# Patient Record
Sex: FEMALE | Race: WHITE | NOT HISPANIC OR LATINO
[De-identification: names, ages, dates, MRNs, and addresses within clinical notes are randomized per-mention and may not be internally consistent; named-entity substitution may affect disease eponyms.]

---

## 2020-10-01 PROBLEM — Z00.00 ENCOUNTER FOR PREVENTIVE HEALTH EXAMINATION: Status: ACTIVE | Noted: 2020-10-01

## 2020-10-03 ENCOUNTER — APPOINTMENT (OUTPATIENT)
Dept: CT IMAGING | Facility: IMAGING CENTER | Age: 50
End: 2020-10-03

## 2020-10-21 ENCOUNTER — APPOINTMENT (OUTPATIENT)
Dept: CT IMAGING | Facility: IMAGING CENTER | Age: 50
End: 2020-10-21

## 2020-11-17 ENCOUNTER — OUTPATIENT (OUTPATIENT)
Dept: OUTPATIENT SERVICES | Facility: HOSPITAL | Age: 50
LOS: 1 days | End: 2020-11-17
Payer: COMMERCIAL

## 2020-11-17 ENCOUNTER — APPOINTMENT (OUTPATIENT)
Dept: CT IMAGING | Facility: IMAGING CENTER | Age: 50
End: 2020-11-17
Payer: COMMERCIAL

## 2020-11-17 DIAGNOSIS — Z00.8 ENCOUNTER FOR OTHER GENERAL EXAMINATION: ICD-10-CM

## 2020-11-17 PROCEDURE — 74177 CT ABD & PELVIS W/CONTRAST: CPT

## 2020-11-17 PROCEDURE — 74177 CT ABD & PELVIS W/CONTRAST: CPT | Mod: 26

## 2021-10-12 ENCOUNTER — FORM ENCOUNTER (OUTPATIENT)
Age: 51
End: 2021-10-12

## 2022-11-09 ENCOUNTER — NON-APPOINTMENT (OUTPATIENT)
Age: 52
End: 2022-11-09

## 2022-11-09 RX ORDER — CLOTRIMAZOLE AND BETAMETHASONE DIPROPIONATE 10; .5 MG/G; MG/G
1-0.05 CREAM TOPICAL
Qty: 1 | Refills: 0 | Status: ACTIVE | COMMUNITY
Start: 2022-11-09 | End: 1900-01-01

## 2022-12-13 ENCOUNTER — NON-APPOINTMENT (OUTPATIENT)
Age: 52
End: 2022-12-13

## 2022-12-20 ENCOUNTER — NON-APPOINTMENT (OUTPATIENT)
Age: 52
End: 2022-12-20

## 2022-12-22 ENCOUNTER — APPOINTMENT (OUTPATIENT)
Dept: SURGERY | Facility: CLINIC | Age: 52
End: 2022-12-22

## 2022-12-22 VITALS
DIASTOLIC BLOOD PRESSURE: 92 MMHG | WEIGHT: 200 LBS | BODY MASS INDEX: 30.31 KG/M2 | SYSTOLIC BLOOD PRESSURE: 150 MMHG | HEART RATE: 79 BPM | HEIGHT: 68 IN | OXYGEN SATURATION: 95 %

## 2022-12-22 DIAGNOSIS — Z78.9 OTHER SPECIFIED HEALTH STATUS: ICD-10-CM

## 2022-12-22 DIAGNOSIS — Z80.0 FAMILY HISTORY OF MALIGNANT NEOPLASM OF DIGESTIVE ORGANS: ICD-10-CM

## 2022-12-22 DIAGNOSIS — Z82.49 FAMILY HISTORY OF ISCHEMIC HEART DISEASE AND OTHER DISEASES OF THE CIRCULATORY SYSTEM: ICD-10-CM

## 2022-12-22 PROCEDURE — 99202 OFFICE O/P NEW SF 15 MIN: CPT

## 2022-12-22 NOTE — ASSESSMENT
[FreeTextEntry1] : I have discussed with pt that she needs a breast MRI for staging purposes . pt to return to office after.\par Pt has also requested a genetic testing and it sent.

## 2022-12-22 NOTE — HISTORY OF PRESENT ILLNESS
[FreeTextEntry1] : 52 year old white female who felt a right breast lump and went for a core biopsy that showed invasive cancer., She denies any other breast lesions and does not have any breast pain or nipple discharge.\par Risk factors.\par   FHX negative\par   1st period 14\par    1st child 32\par    without prior biopsy

## 2022-12-22 NOTE — REVIEW OF SYSTEMS
[Skin Lesions] : no skin lesions [Skin Wound] : no skin wound [Itching] : no itching [Change In A Mole] : no change in a mole [Breast Pain] : no breast pain [Breast Lump] : breast lump [Negative] : Heme/Lymph

## 2022-12-22 NOTE — PAST MEDICAL HISTORY
[Menarche Age ____] : age at menarche was [unfilled] [Definite ___ (Date)] : the last menstrual period was [unfilled] [Total Preg ___] : G[unfilled] [Live Births ___] : P[unfilled]  [Full Term ___] : Full Term: [unfilled] [Age At Live Birth ___] : Age at live birth: [unfilled] [FreeTextEntry5] : N/A [FreeTextEntry6] : N/A [FreeTextEntry7] : N/A [FreeTextEntry8] : N/A

## 2022-12-23 ENCOUNTER — APPOINTMENT (OUTPATIENT)
Dept: OBGYN | Facility: CLINIC | Age: 52
End: 2022-12-23

## 2022-12-30 ENCOUNTER — RESULT REVIEW (OUTPATIENT)
Age: 52
End: 2022-12-30

## 2022-12-30 ENCOUNTER — OUTPATIENT (OUTPATIENT)
Dept: OUTPATIENT SERVICES | Facility: HOSPITAL | Age: 52
LOS: 1 days | End: 2022-12-30
Payer: COMMERCIAL

## 2022-12-30 VITALS
HEART RATE: 72 BPM | RESPIRATION RATE: 16 BRPM | DIASTOLIC BLOOD PRESSURE: 84 MMHG | TEMPERATURE: 98 F | OXYGEN SATURATION: 98 % | HEIGHT: 68 IN | WEIGHT: 227.08 LBS | SYSTOLIC BLOOD PRESSURE: 134 MMHG

## 2022-12-30 DIAGNOSIS — Z90.49 ACQUIRED ABSENCE OF OTHER SPECIFIED PARTS OF DIGESTIVE TRACT: Chronic | ICD-10-CM

## 2022-12-30 DIAGNOSIS — N63.10 UNSPECIFIED LUMP IN THE RIGHT BREAST, UNSPECIFIED QUADRANT: ICD-10-CM

## 2022-12-30 DIAGNOSIS — Z01.818 ENCOUNTER FOR OTHER PREPROCEDURAL EXAMINATION: ICD-10-CM

## 2022-12-30 DIAGNOSIS — Z98.891 HISTORY OF UTERINE SCAR FROM PREVIOUS SURGERY: Chronic | ICD-10-CM

## 2022-12-30 DIAGNOSIS — C50.511 MALIGNANT NEOPLASM OF LOWER-OUTER QUADRANT OF RIGHT FEMALE BREAST: ICD-10-CM

## 2022-12-30 LAB
ANION GAP SERPL CALC-SCNC: 7 MMOL/L — SIGNIFICANT CHANGE UP (ref 5–17)
BUN SERPL-MCNC: 14 MG/DL — SIGNIFICANT CHANGE UP (ref 7–23)
CALCIUM SERPL-MCNC: 8.7 MG/DL — SIGNIFICANT CHANGE UP (ref 8.5–10.1)
CHLORIDE SERPL-SCNC: 109 MMOL/L — HIGH (ref 96–108)
CO2 SERPL-SCNC: 26 MMOL/L — SIGNIFICANT CHANGE UP (ref 22–31)
CREAT SERPL-MCNC: 0.63 MG/DL — SIGNIFICANT CHANGE UP (ref 0.5–1.3)
EGFR: 107 ML/MIN/1.73M2 — SIGNIFICANT CHANGE UP
GLUCOSE SERPL-MCNC: 93 MG/DL — SIGNIFICANT CHANGE UP (ref 70–99)
HCG UR QL: NEGATIVE — SIGNIFICANT CHANGE UP
HCT VFR BLD CALC: 32.7 % — LOW (ref 34.5–45)
HGB BLD-MCNC: 10.5 G/DL — LOW (ref 11.5–15.5)
MCHC RBC-ENTMCNC: 27.3 PG — SIGNIFICANT CHANGE UP (ref 27–34)
MCHC RBC-ENTMCNC: 32.1 GM/DL — SIGNIFICANT CHANGE UP (ref 32–36)
MCV RBC AUTO: 85.2 FL — SIGNIFICANT CHANGE UP (ref 80–100)
NRBC # BLD: 0 /100 WBCS — SIGNIFICANT CHANGE UP (ref 0–0)
PLATELET # BLD AUTO: 306 K/UL — SIGNIFICANT CHANGE UP (ref 150–400)
POTASSIUM SERPL-MCNC: 4 MMOL/L — SIGNIFICANT CHANGE UP (ref 3.5–5.3)
POTASSIUM SERPL-SCNC: 4 MMOL/L — SIGNIFICANT CHANGE UP (ref 3.5–5.3)
RBC # BLD: 3.84 M/UL — SIGNIFICANT CHANGE UP (ref 3.8–5.2)
RBC # FLD: 15.2 % — HIGH (ref 10.3–14.5)
SODIUM SERPL-SCNC: 142 MMOL/L — SIGNIFICANT CHANGE UP (ref 135–145)
WBC # BLD: 6.43 K/UL — SIGNIFICANT CHANGE UP (ref 3.8–10.5)
WBC # FLD AUTO: 6.43 K/UL — SIGNIFICANT CHANGE UP (ref 3.8–10.5)

## 2022-12-30 PROCEDURE — 80048 BASIC METABOLIC PNL TOTAL CA: CPT

## 2022-12-30 PROCEDURE — 36415 COLL VENOUS BLD VENIPUNCTURE: CPT

## 2022-12-30 PROCEDURE — 88321 CONSLTJ&REPRT SLD PREP ELSWR: CPT

## 2022-12-30 PROCEDURE — 85027 COMPLETE CBC AUTOMATED: CPT

## 2022-12-30 PROCEDURE — G0463: CPT

## 2022-12-30 PROCEDURE — 93010 ELECTROCARDIOGRAM REPORT: CPT

## 2022-12-30 PROCEDURE — 93005 ELECTROCARDIOGRAM TRACING: CPT

## 2022-12-30 PROCEDURE — 81025 URINE PREGNANCY TEST: CPT

## 2022-12-30 NOTE — H&P PST ADULT - SKIN COMMENTS
right breast . Palpable lump midline 6 o' clock, had biopsy. sterri strips in place. ecchymotic area at the biopsy site

## 2022-12-30 NOTE — H&P PST ADULT - NSICDXFAMILYHX_GEN_ALL_CORE_FT
FAMILY HISTORY:  Father  Still living? No  FH: cirrhosis, Age at diagnosis: Age Unknown    Mother  Still living? Yes, Estimated age: 71-80  FH: stroke, Age at diagnosis: Age Unknown

## 2022-12-30 NOTE — H&P PST ADULT - HISTORY OF PRESENT ILLNESS
53 y/o healthy female with c/o feeling a lump in the right breast in late October 2022, had a mammo, sonogram, MRI and a biopsy done in the past few weeks. Diagnosed with ductal carcinoma. Seen by Dr Robison, scheduled for right breast needle LOC excision of mass w/sentinel node biopsy on 1/9/23. Pre op testing today.

## 2022-12-30 NOTE — H&P PST ADULT - PROBLEM SELECTOR PLAN 1
Scheduled for right breast needle LOC excision of mass w/sentinel node biopsy on 1/9/23. Pre op testing today.  Not Vaccinated for Covid. Had covid Dec 2021 was sick for many days with cough and breathing difficulty.   LABS AND EKG DONE TODAY  Medical eval advised.  PRE OP INSTRUCTIONS:  Hold OTC supplements. Medications reviewed for the week and morning of surgery.  NPO after 11pm to the morning of surgery.  Shower 2 days before and the morning of surgery with antibacterial soap as instructed.  Covid testing 3 days prior to procedure, information given.  Patient verbalized understanding.

## 2022-12-30 NOTE — H&P PST ADULT - NSANTHOSAYNRD_GEN_A_CORE
No. REMI screening performed.  STOP BANG Legend: 0-2 = LOW Risk; 3-4 = INTERMEDIATE Risk; 5-8 = HIGH Risk

## 2023-01-03 ENCOUNTER — APPOINTMENT (OUTPATIENT)
Dept: SURGERY | Facility: CLINIC | Age: 53
End: 2023-01-03
Payer: COMMERCIAL

## 2023-01-03 PROCEDURE — 99212 OFFICE O/P EST SF 10 MIN: CPT

## 2023-01-03 NOTE — HISTORY OF PRESENT ILLNESS
[FreeTextEntry1] : 52 year old white female who felt a right breast lump and went for a core biopsy that showed invasive cancer., She denies any other breast lesions and does not have any breast pain or nipple discharge.\par Risk factors.\par   FHX negative\par   1st period 14\par    1st child 32\par    without prior biopsy\par 1/3/22 ADDENDUM\par Pt had an MRI of her breast and is here to go over results and to discuss receptor status.

## 2023-01-03 NOTE — ASSESSMENT
[FreeTextEntry1] : I have discussed with pt that\par           ER and DC receptors are positive 90%\par           HER-2-nu is negative\par      a new lesion on the left breast was identified and needs an MRI core biopsty\par      Genetic testing is still pending

## 2023-01-03 NOTE — PHYSICAL EXAM
[Normocephalic] : normocephalic [Atraumatic] : atraumatic [EOMI] : extra ocular movement intact [PERRL] : pupils equal, round and reactive to light [Sclera nonicteric] : sclera nonicteric [Supple] : supple [No Supraclavicular Adenopathy] : no supraclavicular adenopathy [Clear to Auscultation Bilat] : clear to auscultation bilaterally [Normal Sinus Rhythm] : normal sinus rhythm [Examined in the supine and seated position] : examined in the supine and seated position [Symmetrical] : symmetrical [No dominant masses] : no dominant masses in right breast  [No dominant masses] : no dominant masses left breast [No Nipple Retraction] : no left nipple retraction [No Nipple Discharge] : no left nipple discharge [No Axillary Lymphadenopathy] : no left axillary lymphadenopathy [de-identified] : well developed female in no acute distress

## 2023-01-09 DIAGNOSIS — Z13.820 ENCOUNTER FOR SCREENING FOR OSTEOPOROSIS: ICD-10-CM

## 2023-01-10 ENCOUNTER — APPOINTMENT (OUTPATIENT)
Dept: OBGYN | Facility: CLINIC | Age: 53
End: 2023-01-10
Payer: COMMERCIAL

## 2023-01-10 VITALS
DIASTOLIC BLOOD PRESSURE: 86 MMHG | RESPIRATION RATE: 16 BRPM | SYSTOLIC BLOOD PRESSURE: 132 MMHG | WEIGHT: 220 LBS | HEART RATE: 73 BPM | BODY MASS INDEX: 33.34 KG/M2 | OXYGEN SATURATION: 95 % | HEIGHT: 68 IN

## 2023-01-10 DIAGNOSIS — B36.9 SUPERFICIAL MYCOSIS, UNSPECIFIED: ICD-10-CM

## 2023-01-10 DIAGNOSIS — Z12.11 ENCOUNTER FOR SCREENING FOR MALIGNANT NEOPLASM OF COLON: ICD-10-CM

## 2023-01-10 DIAGNOSIS — Z01.419 ENCOUNTER FOR GYNECOLOGICAL EXAMINATION (GENERAL) (ROUTINE) W/OUT ABNORMAL FINDINGS: ICD-10-CM

## 2023-01-10 DIAGNOSIS — D25.9 LEIOMYOMA OF UTERUS, UNSPECIFIED: ICD-10-CM

## 2023-01-10 DIAGNOSIS — Z12.4 ENCOUNTER FOR SCREENING FOR MALIGNANT NEOPLASM OF CERVIX: ICD-10-CM

## 2023-01-10 DIAGNOSIS — K64.9 UNSPECIFIED HEMORRHOIDS: ICD-10-CM

## 2023-01-10 DIAGNOSIS — Z12.39 ENCOUNTER FOR OTHER SCREENING FOR MALIGNANT NEOPLASM OF BREAST: ICD-10-CM

## 2023-01-10 DIAGNOSIS — Z13.31 ENCOUNTER FOR SCREENING FOR DEPRESSION: ICD-10-CM

## 2023-01-10 DIAGNOSIS — R92.8 OTHER ABNORMAL AND INCONCLUSIVE FINDINGS ON DIAGNOSTIC IMAGING OF BREAST: ICD-10-CM

## 2023-01-10 DIAGNOSIS — Z85.3 PERSONAL HISTORY OF MALIGNANT NEOPLASM OF BREAST: ICD-10-CM

## 2023-01-10 LAB
BILIRUB UR QL STRIP: NORMAL
CLARITY UR: CLEAR
COLLECTION METHOD: NORMAL
GLUCOSE UR-MCNC: NORMAL
HCG UR QL: 0.2 EU/DL
HGB UR QL STRIP.AUTO: NORMAL
KETONES UR-MCNC: NORMAL
LEUKOCYTE ESTERASE UR QL STRIP: NORMAL
NITRITE UR QL STRIP: NORMAL
PH UR STRIP: 6
PROT UR STRIP-MCNC: NORMAL
SP GR UR STRIP: 1.03

## 2023-01-10 PROCEDURE — 81003 URINALYSIS AUTO W/O SCOPE: CPT | Mod: QW

## 2023-01-10 PROCEDURE — 82270 OCCULT BLOOD FECES: CPT

## 2023-01-10 PROCEDURE — 99386 PREV VISIT NEW AGE 40-64: CPT

## 2023-01-10 RX ORDER — CLOTRIMAZOLE AND BETAMETHASONE DIPROPIONATE 10; .5 MG/G; MG/G
1-0.05 CREAM TOPICAL TWICE DAILY
Qty: 1 | Refills: 1 | Status: ACTIVE | COMMUNITY
Start: 2023-01-10 | End: 1900-01-01

## 2023-01-10 RX ORDER — HYDROCORTISONE 25 MG/G
2.5 CREAM TOPICAL DAILY
Qty: 1 | Refills: 1 | Status: ACTIVE | COMMUNITY
Start: 2023-01-10 | End: 1900-01-01

## 2023-01-10 NOTE — PLAN
[FreeTextEntry1] : I have spent 40 minutes of time on this encounter.  Greater than 50% of the face-to-face encounter time was spent on counseling and/or coordination of care for examination findings, differential, testing, management and planning. 10 minutes were allotted to discussing the depression screening. Yearly breast cancer screening with no current clinical or radiographic concerns.  The patient was reminded regarding future well breast and general healthcare, breast cancer risk reduction, the importance of self-examination and the need for follow up.   She was again reminded of the need to take Vit D3 2500  IU daily or to keep a Vit D level above 30, and Tumeric 1000mg daily with Black Pepper.  Plan continued yearly imaging and breast follow up, sooner as needed.  I counseled the patient on current recommendations to reduce breast cancer risk including but not inclusive to regular exercise 20-30 minutes 3-4 times a week, low fat diet, limiting alcohol consumption, maintenance of ideal body weight, yearly imaging and self breast awareness.  Questions answered.  I encouraged in light of Covid 19, social distancing, frequent hand washing and precautions to stay healthy.\par  1)  Self breast exam instructions, calcium supplementation discussed with the patient.\par 2)  Mammography, lipid profile assessment, TSH screening, fasting glucose testing, colonoscopy screening were discussed with the patient.  Vitamin D supplementation\par 3)  Maintain healthy weight.\par 4)  Regular health maintenance with PCP.\par 5)  Remain tobacco free.\par 6)  Limit alcohol intake to less than 5 drinks per week.\par 7)  Osteoporosis screening.\par 8)  Annual cholesterol screening.\par 9)  Annual influenza vaccine.The importance of routine physical activity was reviewed and a goal of 150 minutes of moderately vigorous exercise per week was endorsed. \par \par PT had questions regarding recent breast cancer diagnosis. Lumpectomy, unilateral mastectomy, and bilateral mastectomy was discussed at length. The risks, benefits and alternatives were discussed and all questions were answered.

## 2023-01-10 NOTE — HISTORY OF PRESENT ILLNESS
[N] : Patient reports normal menses [Y] : Positive pregnancy history [Hot Flashes] : hot flashes [Night Sweats] : night sweats [Currently Active] : currently active [Men] : men [Mammogramdate] : 12/22 [BreastSonogramDate] : 12/22 [PapSmeardate] : 01/22 [LMPDate] : 01/06/23 [MensesLength] : 4 [PGHxTotal] : 2 [TextBox_6] : 01/06/23 [TextBox_15] : 4 [FreeTextEntry1] : 01/06/23

## 2023-01-10 NOTE — PHYSICAL EXAM
[Chaperone Present] : A chaperone was present in the examining room during all aspects of the physical examination [Appropriately responsive] : appropriately responsive [Alert] : alert [No Acute Distress] : no acute distress [No Lymphadenopathy] : no lymphadenopathy [Regular Rate Rhythm] : regular rate rhythm [No Murmurs] : no murmurs [Clear to Auscultation B/L] : clear to auscultation bilaterally [Soft] : soft [Non-tender] : non-tender [Non-distended] : non-distended [No HSM] : No HSM [No Lesions] : no lesions [No Mass] : no mass [Oriented x3] : oriented x3 [Examination Of The Breasts] : a normal appearance [Labia Majora] : normal [Labia Minora] : normal [Normal] : normal [Uterine Adnexae] : normal [External Hemorrhoid] : external hemorrhoid [___cm] : a ~M [unfilled] ~Ucm mass was palpated deep to the nipple [FreeTextEntry1] : The documentation for this encounter was entered by Angeles Kumar acting as a scribe for Dr. Maravilla.  [Occult Blood Positive] : was negative for occult blood analysis [FreeTextEntry6] : small fibroid

## 2023-01-12 LAB
HPV 16 E6+E7 MRNA CVX QL NAA+PROBE: NOT DETECTED
HPV18+45 E6+E7 MRNA CVX QL NAA+PROBE: NOT DETECTED

## 2023-01-16 ENCOUNTER — NON-APPOINTMENT (OUTPATIENT)
Age: 53
End: 2023-01-16

## 2023-01-16 LAB — CYTOLOGY CVX/VAG DOC THIN PREP: ABNORMAL

## 2023-01-18 RX ORDER — BETAMETHASONE DIPROPIONATE 0.5 MG/G
0.05 CREAM TOPICAL TWICE DAILY
Qty: 1 | Refills: 1 | Status: ACTIVE | COMMUNITY
Start: 2023-01-18

## 2023-01-18 RX ORDER — CLOTRIMAZOLE 10 MG/G
1 CREAM TOPICAL 3 TIMES DAILY
Qty: 1 | Refills: 1 | Status: ACTIVE | COMMUNITY
Start: 2023-01-18

## 2023-02-06 ENCOUNTER — OUTPATIENT (OUTPATIENT)
Dept: OUTPATIENT SERVICES | Facility: HOSPITAL | Age: 53
LOS: 1 days | End: 2023-02-06
Payer: COMMERCIAL

## 2023-02-06 DIAGNOSIS — Z98.891 HISTORY OF UTERINE SCAR FROM PREVIOUS SURGERY: Chronic | ICD-10-CM

## 2023-02-06 DIAGNOSIS — Z20.828 CONTACT WITH AND (SUSPECTED) EXPOSURE TO OTHER VIRAL COMMUNICABLE DISEASES: ICD-10-CM

## 2023-02-06 DIAGNOSIS — Z90.49 ACQUIRED ABSENCE OF OTHER SPECIFIED PARTS OF DIGESTIVE TRACT: Chronic | ICD-10-CM

## 2023-02-06 LAB — SARS-COV-2 RNA SPEC QL NAA+PROBE: SIGNIFICANT CHANGE UP

## 2023-02-06 PROCEDURE — U0005: CPT

## 2023-02-06 PROCEDURE — U0003: CPT

## 2023-02-07 ENCOUNTER — TRANSCRIPTION ENCOUNTER (OUTPATIENT)
Age: 53
End: 2023-02-07

## 2023-02-07 LAB — SURGICAL PATHOLOGY STUDY: SIGNIFICANT CHANGE UP

## 2023-02-07 RX ORDER — SODIUM CHLORIDE 9 MG/ML
1000 INJECTION, SOLUTION INTRAVENOUS
Refills: 0 | Status: DISCONTINUED | OUTPATIENT
Start: 2023-02-08 | End: 2023-02-22

## 2023-02-07 NOTE — ASU PATIENT PROFILE, ADULT - FALL HARM RISK - FALL HARM RISK
Well controlled with medications. Continue present therapy but avoid hypotension given syncope.    No indicators present

## 2023-02-08 ENCOUNTER — OUTPATIENT (OUTPATIENT)
Dept: OUTPATIENT SERVICES | Facility: HOSPITAL | Age: 53
LOS: 1 days | End: 2023-02-08
Payer: COMMERCIAL

## 2023-02-08 ENCOUNTER — TRANSCRIPTION ENCOUNTER (OUTPATIENT)
Age: 53
End: 2023-02-08

## 2023-02-08 ENCOUNTER — RESULT REVIEW (OUTPATIENT)
Age: 53
End: 2023-02-08

## 2023-02-08 ENCOUNTER — APPOINTMENT (OUTPATIENT)
Dept: SURGERY | Facility: HOSPITAL | Age: 53
End: 2023-02-08

## 2023-02-08 VITALS
OXYGEN SATURATION: 98 % | HEIGHT: 68 IN | SYSTOLIC BLOOD PRESSURE: 130 MMHG | TEMPERATURE: 99 F | DIASTOLIC BLOOD PRESSURE: 81 MMHG | WEIGHT: 227.08 LBS | RESPIRATION RATE: 16 BRPM | HEART RATE: 66 BPM

## 2023-02-08 VITALS
HEART RATE: 55 BPM | DIASTOLIC BLOOD PRESSURE: 77 MMHG | RESPIRATION RATE: 16 BRPM | SYSTOLIC BLOOD PRESSURE: 142 MMHG | OXYGEN SATURATION: 97 %

## 2023-02-08 DIAGNOSIS — Z98.891 HISTORY OF UTERINE SCAR FROM PREVIOUS SURGERY: Chronic | ICD-10-CM

## 2023-02-08 DIAGNOSIS — Z90.49 ACQUIRED ABSENCE OF OTHER SPECIFIED PARTS OF DIGESTIVE TRACT: Chronic | ICD-10-CM

## 2023-02-08 DIAGNOSIS — C50.511 MALIGNANT NEOPLASM OF LOWER-OUTER QUADRANT OF RIGHT FEMALE BREAST: ICD-10-CM

## 2023-02-08 DIAGNOSIS — C50.919 MALIGNANT NEOPLASM OF UNSPECIFIED SITE OF UNSPECIFIED FEMALE BREAST: ICD-10-CM

## 2023-02-08 LAB — HCG UR QL: NEGATIVE — SIGNIFICANT CHANGE UP

## 2023-02-08 PROCEDURE — 19281 PERQ DEVICE BREAST 1ST IMAG: CPT | Mod: RT

## 2023-02-08 PROCEDURE — A9541: CPT

## 2023-02-08 PROCEDURE — 19301 PARTIAL MASTECTOMY: CPT | Mod: RT

## 2023-02-08 PROCEDURE — 88333 PATH CONSLTJ SURG CYTO XM 1: CPT

## 2023-02-08 PROCEDURE — 38525 BIOPSY/REMOVAL LYMPH NODES: CPT

## 2023-02-08 PROCEDURE — 81025 URINE PREGNANCY TEST: CPT

## 2023-02-08 PROCEDURE — 78195 LYMPH SYSTEM IMAGING: CPT | Mod: MG

## 2023-02-08 PROCEDURE — 88334 PATH CONSLTJ SURG CYTO XM EA: CPT | Mod: 26,59

## 2023-02-08 PROCEDURE — 88307 TISSUE EXAM BY PATHOLOGIST: CPT

## 2023-02-08 PROCEDURE — 88307 TISSUE EXAM BY PATHOLOGIST: CPT | Mod: 26

## 2023-02-08 PROCEDURE — C1889: CPT

## 2023-02-08 PROCEDURE — 38900 IO MAP OF SENT LYMPH NODE: CPT | Mod: XS

## 2023-02-08 PROCEDURE — 38900 IO MAP OF SENT LYMPH NODE: CPT

## 2023-02-08 PROCEDURE — 76098 X-RAY EXAM SURGICAL SPECIMEN: CPT

## 2023-02-08 PROCEDURE — 78195 LYMPH SYSTEM IMAGING: CPT | Mod: 26

## 2023-02-08 PROCEDURE — 19302 P-MASTECTOMY W/LN REMOVAL: CPT | Mod: RT

## 2023-02-08 PROCEDURE — 76098 X-RAY EXAM SURGICAL SPECIMEN: CPT | Mod: 26

## 2023-02-08 PROCEDURE — 19281 PERQ DEVICE BREAST 1ST IMAG: CPT

## 2023-02-08 PROCEDURE — 88334 PATH CONSLTJ SURG CYTO XM EA: CPT

## 2023-02-08 PROCEDURE — 88331 PATH CONSLTJ SURG 1 BLK 1SPC: CPT

## 2023-02-08 PROCEDURE — 88331 PATH CONSLTJ SURG 1 BLK 1SPC: CPT | Mod: 26

## 2023-02-08 PROCEDURE — G1004: CPT

## 2023-02-08 DEVICE — CLIP APPLIER ETHICON LIGACLIP 11.5" MEDIUM: Type: IMPLANTABLE DEVICE | Site: RIGHT | Status: FUNCTIONAL

## 2023-02-08 RX ORDER — OXYCODONE HYDROCHLORIDE 5 MG/1
5 TABLET ORAL ONCE
Refills: 0 | Status: DISCONTINUED | OUTPATIENT
Start: 2023-02-08 | End: 2023-02-08

## 2023-02-08 RX ORDER — SODIUM CHLORIDE 9 MG/ML
1000 INJECTION, SOLUTION INTRAVENOUS
Refills: 0 | Status: DISCONTINUED | OUTPATIENT
Start: 2023-02-08 | End: 2023-02-08

## 2023-02-08 RX ORDER — CEFAZOLIN SODIUM 1 G
2000 VIAL (EA) INJECTION ONCE
Refills: 0 | Status: COMPLETED | OUTPATIENT
Start: 2023-02-08 | End: 2023-02-08

## 2023-02-08 RX ORDER — ONDANSETRON 8 MG/1
4 TABLET, FILM COATED ORAL ONCE
Refills: 0 | Status: DISCONTINUED | OUTPATIENT
Start: 2023-02-08 | End: 2023-02-08

## 2023-02-08 RX ORDER — DOCUSATE SODIUM 100 MG
1 CAPSULE ORAL
Qty: 30 | Refills: 0
Start: 2023-02-08

## 2023-02-08 RX ORDER — HYDROMORPHONE HYDROCHLORIDE 2 MG/ML
0.5 INJECTION INTRAMUSCULAR; INTRAVENOUS; SUBCUTANEOUS
Refills: 0 | Status: DISCONTINUED | OUTPATIENT
Start: 2023-02-08 | End: 2023-02-08

## 2023-02-08 RX ORDER — OXYCODONE AND ACETAMINOPHEN 5; 325 MG/1; MG/1
1 TABLET ORAL
Qty: 15 | Refills: 0
Start: 2023-02-08

## 2023-02-08 RX ADMIN — SODIUM CHLORIDE 75 MILLILITER(S): 9 INJECTION, SOLUTION INTRAVENOUS at 15:50

## 2023-02-08 RX ADMIN — SODIUM CHLORIDE 75 MILLILITER(S): 9 INJECTION, SOLUTION INTRAVENOUS at 09:07

## 2023-02-08 RX ADMIN — HYDROMORPHONE HYDROCHLORIDE 0.5 MILLIGRAM(S): 2 INJECTION INTRAMUSCULAR; INTRAVENOUS; SUBCUTANEOUS at 15:49

## 2023-02-08 NOTE — ASU DISCHARGE PLAN (ADULT/PEDIATRIC) - CARE PROVIDER_API CALL
Brendon Robison)  Surgery  [address] [place] [address] [id number]  Phone: [phone]  Fax: [phone]  Follow Up Time:

## 2023-02-08 NOTE — ASU DISCHARGE PLAN (ADULT/PEDIATRIC) - ASU DC SPECIAL INSTRUCTIONSFT
You may remove the dressing (tape and gauze only) and shower in 48 hours.  Do not remove steri-strips, do not let water hit wounds directly, do not rub incisions.      Do not drive for 24 hours following anesthesia.  Do not drive while taking narcotic pain medication.  Do not drive until pain-free.     No exercise, strenuous physical activity, or heavy lifting (nothing heavier than 5 lbs).      Wear a tight bra for compression of the incision, and apply ice liberally as tolerated (20min on/20min off the incision site for 2-3 hours a day, at least twice a day).      You may resume your usual daily diet as tolerated.     You may perform your usual daily activities as tolerated (e.g. walking, stairclimbing, etc.).      Take Percocet as prescribed for severe pain.  Take Ibuprofen as prescribed for moderate pain - take with food.       Follow-up with Dr. Robison in his office next week - call office for appointment if not already made. You may remove the dressing (tape and gauze only) and shower in 48 hours.  Do not remove steri-strips, do not let water hit wounds directly, do not rub incisions.      Do not drive for 24 hours following anesthesia.  Do not drive while taking narcotic pain medication.  Do not drive until pain-free.     No exercise, strenuous physical activity, or heavy lifting (nothing heavier than 5 lbs).      Wear a tight bra for compression of the incision, and apply ice liberally as tolerated (20min on/20min off the incision site for 2-3 hours a day, at least twice a day).      You may resume your usual daily diet as tolerated.     You may perform your usual daily activities as tolerated (e.g. walking, stair-climbing, etc.).      Take Percocet as prescribed for severe pain.  Take Ibuprofen as prescribed for moderate pain - take with food.       Follow-up with Dr. Robison in his office next week - call office for appointment if not already made.

## 2023-02-08 NOTE — PRE-OP CHECKLIST - NS ASU TO WHOM HOLDING TO OR
Left anterior lower neck with bandage CDI without bleeding or complication.  Discharge instruction given to pt, pt without questions or concerns.      jenna chandler

## 2023-02-08 NOTE — ASU DISCHARGE PLAN (ADULT/PEDIATRIC) - NS MD DC FALL RISK RISK
For information on Fall & Injury Prevention, visit: [email or web address] OR  [email or web address] OR  https://www.cdc.gov/steadi/patient.html

## 2023-02-08 NOTE — BRIEF OPERATIVE NOTE - NSICDXBRIEFPROCEDURE_GEN_ALL_CORE_FT
PROCEDURES:  Right breast lumpectomy 08-Feb-2023 15:16:37  Brendon Robison  Biopsy, breast and axillary node, with needle localization 08-Feb-2023 15:17:13  Brendon Robison  Localization of sentinel lymph node 08-Feb-2023 15:17:44  Brendon Robison

## 2023-02-14 ENCOUNTER — APPOINTMENT (OUTPATIENT)
Dept: SURGERY | Facility: CLINIC | Age: 53
End: 2023-02-14
Payer: COMMERCIAL

## 2023-02-14 PROCEDURE — 99024 POSTOP FOLLOW-UP VISIT: CPT

## 2023-02-14 NOTE — HISTORY OF PRESENT ILLNESS
[FreeTextEntry1] : 52 year old white female who felt a right breast lump and went for a core biopsy that showed invasive cancer., She denies any other breast lesions and does not have any breast pain or nipple discharge.\par Risk factors.\par   FHX negative\par   1st period 14\par    1st child 32\par    without prior biopsy\par 1/3/22 ADDENDUM\par Pt had an MRI of her breast and is here to go over results and to discuss receptor status.\par 2/8/23 s/p needle localized right lumpectomy and sentinel node biopsy [facility]\par 2/14/23 Pt is post op and offers no complaints, minimal pain and without drainage.

## 2023-02-14 NOTE — ASSESSMENT
[FreeTextEntry1] : Pt doing well, but pathology is still pending\par PLAN make appointment with Oncologist\par            RTO on Thursday to go over results.

## 2023-02-14 NOTE — PHYSICAL EXAM
[Normocephalic] : normocephalic [Atraumatic] : atraumatic [EOMI] : extra ocular movement intact [PERRL] : pupils equal, round and reactive to light [Sclera nonicteric] : sclera nonicteric [Supple] : supple [No Supraclavicular Adenopathy] : no supraclavicular adenopathy [No Cervical Adenopathy] : no cervical adenopathy [Clear to Auscultation Bilat] : clear to auscultation bilaterally [Normal Sinus Rhythm] : normal sinus rhythm [Examined in the supine and seated position] : examined in the supine and seated position [No Axillary Lymphadenopathy] : no left axillary lymphadenopathy [No Edema] : no edema [de-identified] : well developed white female in no distress [de-identified] : incision clean and closed, without erythema [de-identified] : incision clean and closed, without swelling

## 2023-02-15 LAB — SURGICAL PATHOLOGY STUDY: SIGNIFICANT CHANGE UP

## 2023-02-16 ENCOUNTER — APPOINTMENT (OUTPATIENT)
Dept: SURGERY | Facility: CLINIC | Age: 53
End: 2023-02-16
Payer: COMMERCIAL

## 2023-02-16 VITALS — TEMPERATURE: 97.8 F

## 2023-02-16 PROCEDURE — 99024 POSTOP FOLLOW-UP VISIT: CPT

## 2023-02-16 NOTE — ASSESSMENT
[FreeTextEntry1] : I have discussed with pt and  the pathology findings; ie margins are clear and one of six nodes are involved. I contacted pts PMD DR Luna who recommended she see the oncologist Dr Wheeler. All reports were faxed to Dr Wheeler.

## 2023-02-16 NOTE — HISTORY OF PRESENT ILLNESS
[FreeTextEntry1] : 52 year old white female who felt a right breast lump and went for a core biopsy that showed invasive cancer., She denies any other breast lesions and does not have any breast pain or nipple discharge.\par Risk factors.\par   FHX negative\par   1st period 14\par    1st child 32\par    without prior biopsy\par 1/3/22 ADDENDUM\par Pt had an MRI of her breast and is here to go over results and to discuss receptor status.\par 2/8/23 s/p needle localized right lumpectomy and sentinel node biopsy [facility]\par 2/14/23 Pt is post op and offers no complaints, minimal pain and without drainage.\par 2/16/23 Pt is here to discuss her pathology results.

## 2023-02-16 NOTE — PHYSICAL EXAM
[Normocephalic] : normocephalic [Atraumatic] : atraumatic [EOMI] : extra ocular movement intact [PERRL] : pupils equal, round and reactive to light [Sclera nonicteric] : sclera nonicteric [Supple] : supple [No Supraclavicular Adenopathy] : no supraclavicular adenopathy [No Cervical Adenopathy] : no cervical adenopathy [Clear to Auscultation Bilat] : clear to auscultation bilaterally [Normal Sinus Rhythm] : normal sinus rhythm [Examined in the supine and seated position] : examined in the supine and seated position [de-identified] : well developed female in no distress [de-identified] : incision clean and closed [de-identified] : incision clean and closed without swelling

## 2023-05-02 ENCOUNTER — APPOINTMENT (OUTPATIENT)
Dept: SURGERY | Facility: CLINIC | Age: 53
End: 2023-05-02
Payer: COMMERCIAL

## 2023-05-02 DIAGNOSIS — Z98.890 OTHER SPECIFIED POSTPROCEDURAL STATES: ICD-10-CM

## 2023-05-02 PROCEDURE — 99024 POSTOP FOLLOW-UP VISIT: CPT

## 2023-05-02 NOTE — ASSESSMENT
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 Patient and  are upset and I have offered to help in any way I can and If they would like to see another surgeon I will help them get the records that they need.

## 2023-05-02 NOTE — PHYSICAL EXAM
[Normocephalic] : normocephalic [Atraumatic] : atraumatic [EOMI] : extra ocular movement intact [PERRL] : pupils equal, round and reactive to light [Sclera nonicteric] : sclera nonicteric [Supple] : supple [No Supraclavicular Adenopathy] : no supraclavicular adenopathy [No Cervical Adenopathy] : no cervical adenopathy [Clear to Auscultation Bilat] : clear to auscultation bilaterally [Normal Sinus Rhythm] : normal sinus rhythm [Examined in the supine and seated position] : examined in the supine and seated position [No Axillary Lymphadenopathy] : no left axillary lymphadenopathy [de-identified] : white female in oo acute distress [de-identified] : incision is clean and closed.

## 2023-05-02 NOTE — HISTORY OF PRESENT ILLNESS
[FreeTextEntry1] : 52 year old white female who felt a right breast lump and went for a core biopsy that showed invasive cancer., She denies any other breast lesions and does not have any breast pain or nipple discharge.\par Risk factors.\par   FHX negative\par   1st period 14\par    1st child 32\par    without prior biopsy\par 1/3/22 ADDENDUM\par Pt had an MRI of her breast and is here to go over results and to discuss receptor status.\par 2/8/23 s/p needle localized right lumpectomy and sentinel node biopsy [facility]\par 2/14/23 Pt is post op and offers no complaints, minimal pain and without drainage.\par 2/16/23 Pt is here to discuss her pathology results.\par 5/2/23 pt was seen by Radiation therapist who recommends she has a wider incision since 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

## 2023-05-10 ENCOUNTER — OUTPATIENT (OUTPATIENT)
Dept: OUTPATIENT SERVICES | Facility: HOSPITAL | Age: 53
LOS: 1 days | End: 2023-05-10
Payer: COMMERCIAL

## 2023-05-10 VITALS
SYSTOLIC BLOOD PRESSURE: 139 MMHG | OXYGEN SATURATION: 98 % | HEART RATE: 75 BPM | WEIGHT: 229.94 LBS | RESPIRATION RATE: 16 BRPM | DIASTOLIC BLOOD PRESSURE: 94 MMHG | TEMPERATURE: 97 F

## 2023-05-10 DIAGNOSIS — Z98.890 OTHER SPECIFIED POSTPROCEDURAL STATES: Chronic | ICD-10-CM

## 2023-05-10 DIAGNOSIS — Z01.818 ENCOUNTER FOR OTHER PREPROCEDURAL EXAMINATION: ICD-10-CM

## 2023-05-10 DIAGNOSIS — Z90.49 ACQUIRED ABSENCE OF OTHER SPECIFIED PARTS OF DIGESTIVE TRACT: Chronic | ICD-10-CM

## 2023-05-10 DIAGNOSIS — C50.511 MALIGNANT NEOPLASM OF LOWER-OUTER QUADRANT OF RIGHT FEMALE BREAST: ICD-10-CM

## 2023-05-10 DIAGNOSIS — Z98.891 HISTORY OF UTERINE SCAR FROM PREVIOUS SURGERY: Chronic | ICD-10-CM

## 2023-05-10 LAB
HCG UR QL: NEGATIVE — SIGNIFICANT CHANGE UP
HCT VFR BLD CALC: 34.8 % — SIGNIFICANT CHANGE UP (ref 34.5–45)
HGB BLD-MCNC: 10.8 G/DL — LOW (ref 11.5–15.5)
MCHC RBC-ENTMCNC: 26 PG — LOW (ref 27–34)
MCHC RBC-ENTMCNC: 31 GM/DL — LOW (ref 32–36)
MCV RBC AUTO: 83.9 FL — SIGNIFICANT CHANGE UP (ref 80–100)
NRBC # BLD: 0 /100 WBCS — SIGNIFICANT CHANGE UP (ref 0–0)
PLATELET # BLD AUTO: 306 K/UL — SIGNIFICANT CHANGE UP (ref 150–400)
RBC # BLD: 4.15 M/UL — SIGNIFICANT CHANGE UP (ref 3.8–5.2)
RBC # FLD: 15.3 % — HIGH (ref 10.3–14.5)
WBC # BLD: 5.16 K/UL — SIGNIFICANT CHANGE UP (ref 3.8–10.5)
WBC # FLD AUTO: 5.16 K/UL — SIGNIFICANT CHANGE UP (ref 3.8–10.5)

## 2023-05-10 PROCEDURE — G0463: CPT

## 2023-05-10 PROCEDURE — 81025 URINE PREGNANCY TEST: CPT

## 2023-05-10 PROCEDURE — 85027 COMPLETE CBC AUTOMATED: CPT

## 2023-05-10 PROCEDURE — 36415 COLL VENOUS BLD VENIPUNCTURE: CPT

## 2023-05-10 RX ORDER — IBUPROFEN 200 MG
1 TABLET ORAL
Qty: 0 | Refills: 0 | DISCHARGE

## 2023-05-10 NOTE — H&P PST ADULT - PROBLEM SELECTOR PLAN 1
Scheduled for right breast needle LOC excision of mass w/sentinel node biopsy on 1/9/23. Pre op testing today.  Not Vaccinated for Covid. Had covid Dec 2021 was sick for many days with cough and breathing difficulty.   LABS AND EKG DONE TODAY  Medical eval advised.  PRE OP INSTRUCTIONS:  Hold OTC supplements. Medications reviewed for the week and morning of surgery.  NPO after 11pm to the morning of surgery.  Shower 2 days before and the morning of surgery with antibacterial soap as instructed.  Covid testing 3 days prior to procedure, information given.  Patient verbalized understanding. Wide excision right breast mass on 5/26/2023.

## 2023-05-10 NOTE — H&P PST ADULT - NSICDXPROCEDURE_GEN_ALL_CORE_FT
PROCEDURES:  Wide excision of mass of right breast with wire guidance 10-May-2023 08:42:05  Leana Waldron

## 2023-05-10 NOTE — H&P PST ADULT - HISTORY OF PRESENT ILLNESS
53 y/o healthy female with c/o feeling a lump in the right breast in late October 2022, had a mammo, sonogram, MRI and a biopsy done in the past few weeks. Diagnosed with ductal carcinoma. Seen by Dr Robison, scheduled for right breast needle LOC excision of mass w/sentinel node biopsy on 1/9/23. Pre op testing today.      53 y/o healthy female with c/o feeling a lump in the right breast in late October 2022, had a mammo, sonogram, MRI and a biopsy done in the past few weeks. Diagnosed with ductal carcinoma. Seen by Dr Robison, scheduled for right breast needle LOC excision of mass w/sentinel node biopsy on 1/9/23. Pre op testing today.    ADDENDUM: Pt's history reviewed as above. S/p right breast lumpectomy on 1/9/2023 and diagnosed with DCIS. 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 Pt denies right breast mass, rash and pain.   Pt electing for wide excision right breast mass on 5/26/2023.

## 2023-05-10 NOTE — H&P PST ADULT - NEUROLOGICAL
normal/cranial nerves II-XII intact/sensation intact negative normal/sensation intact/responds to verbal commands

## 2023-05-10 NOTE — H&P PST ADULT - PROBLEM SELECTOR PLAN 2
No medical clearance needed as per surgeon. CBC and UCG ordered. Pre-op instructions and surgical scrubs given and pt verbalized understanding.

## 2023-05-10 NOTE — H&P PST ADULT - NEGATIVE GENERAL SYMPTOMS
no fever/no chills/no sweating/no anorexia/no weight loss no fever/no chills/no sweating/no anorexia/no weight loss/no malaise/no fatigue

## 2023-05-10 NOTE — H&P PST ADULT - LYMPHATIC
No lymphadedenopathy posterior cervical L/posterior cervical R/anterior cervical L/anterior cervical R/supraclavicular L/supraclavicular R/axillary R

## 2023-05-10 NOTE — H&P PST ADULT - NSICDXPASTSURGICALHX_GEN_ALL_CORE_FT
PAST SURGICAL HISTORY:  S/P appendectomy     S/P       PAST SURGICAL HISTORY:  History of lymph node dissection of right axilla     S/P appendectomy     S/P      S/P lumpectomy, right breast

## 2023-05-10 NOTE — H&P PST ADULT - SKIN COMMENTS
right breast . Palpable lump midline 6 o' clock, had biopsy. sterri strips in place. ecchymotic area at the biopsy site well healed incision scar to right lower breast, no erythema, no rash

## 2023-05-10 NOTE — H&P PST ADULT - NSICDXPASTMEDICALHX_GEN_ALL_CORE_FT
PAST MEDICAL HISTORY:  No pertinent past medical history PAST MEDICAL HISTORY:  No pertinent past medical history      PAST MEDICAL HISTORY:  Primary malignant neoplasm of lower outer quadrant of right female breast

## 2023-05-10 NOTE — H&P PST ADULT - GENERAL COMMENTS
Had covid Dec 2021 was sick for many days with cough and breathing difficulty. not hospitalized. Denies recent illness in the last 2 weeks, denies cold, flu and viral infection.

## 2023-05-25 ENCOUNTER — TRANSCRIPTION ENCOUNTER (OUTPATIENT)
Age: 53
End: 2023-05-25

## 2023-05-25 NOTE — ASU PATIENT PROFILE, ADULT - NSICDXPASTMEDICALHX_GEN_ALL_CORE_FT
PAST MEDICAL HISTORY:  Primary malignant neoplasm of lower outer quadrant of right female breast

## 2023-05-25 NOTE — ASU PATIENT PROFILE, ADULT - NSICDXPASTSURGICALHX_GEN_ALL_CORE_FT
PAST SURGICAL HISTORY:  History of lymph node dissection of right axilla     S/P appendectomy     S/P      S/P lumpectomy, right breast

## 2023-05-25 NOTE — ASU PATIENT PROFILE, ADULT - FALL HARM RISK - UNIVERSAL INTERVENTIONS
Bed in lowest position, wheels locked, appropriate side rails in place/Call bell, personal items and telephone in reach/Instruct patient to call for assistance before getting out of bed or chair/Non-slip footwear when patient is out of bed/[place] to call system/Physically safe environment - no spills, clutter or unnecessary equipment/Purposeful Proactive Rounding/Room/bathroom lighting operational, light cord in reach

## 2023-05-25 NOTE — ASU PATIENT PROFILE, ADULT - MEDICATIONS TO TAKE
My signature below certifies that the above stated patient is homebound and upon completion of the Face-To-Face encounter, has the need for intermittent skilled nursing, physical therapy and/or speech or occupational therapy services in their home for their current diagnosis as outlined in their initial plan of care. These services will continue to be monitored by myself or another physician.
Anastrozole

## 2023-05-26 ENCOUNTER — OUTPATIENT (OUTPATIENT)
Dept: OUTPATIENT SERVICES | Facility: HOSPITAL | Age: 53
LOS: 1 days | End: 2023-05-26
Payer: COMMERCIAL

## 2023-05-26 ENCOUNTER — TRANSCRIPTION ENCOUNTER (OUTPATIENT)
Age: 53
End: 2023-05-26

## 2023-05-26 ENCOUNTER — APPOINTMENT (OUTPATIENT)
Dept: SURGERY | Facility: HOSPITAL | Age: 53
End: 2023-05-26

## 2023-05-26 ENCOUNTER — RESULT REVIEW (OUTPATIENT)
Age: 53
End: 2023-05-26

## 2023-05-26 VITALS
OXYGEN SATURATION: 98 % | SYSTOLIC BLOOD PRESSURE: 135 MMHG | DIASTOLIC BLOOD PRESSURE: 81 MMHG | HEART RATE: 65 BPM | RESPIRATION RATE: 15 BRPM

## 2023-05-26 VITALS
RESPIRATION RATE: 16 BRPM | OXYGEN SATURATION: 96 % | DIASTOLIC BLOOD PRESSURE: 74 MMHG | SYSTOLIC BLOOD PRESSURE: 135 MMHG | HEART RATE: 67 BPM | HEIGHT: 68 IN | TEMPERATURE: 98 F | WEIGHT: 229.94 LBS

## 2023-05-26 DIAGNOSIS — Z98.890 OTHER SPECIFIED POSTPROCEDURAL STATES: Chronic | ICD-10-CM

## 2023-05-26 DIAGNOSIS — Z90.49 ACQUIRED ABSENCE OF OTHER SPECIFIED PARTS OF DIGESTIVE TRACT: Chronic | ICD-10-CM

## 2023-05-26 DIAGNOSIS — Z98.891 HISTORY OF UTERINE SCAR FROM PREVIOUS SURGERY: Chronic | ICD-10-CM

## 2023-05-26 DIAGNOSIS — C50.511 MALIGNANT NEOPLASM OF LOWER-OUTER QUADRANT OF RIGHT FEMALE BREAST: ICD-10-CM

## 2023-05-26 LAB — HCG UR QL: NEGATIVE — SIGNIFICANT CHANGE UP

## 2023-05-26 PROCEDURE — 88307 TISSUE EXAM BY PATHOLOGIST: CPT | Mod: 26

## 2023-05-26 PROCEDURE — 19301 PARTIAL MASTECTOMY: CPT | Mod: RT

## 2023-05-26 PROCEDURE — 81025 URINE PREGNANCY TEST: CPT

## 2023-05-26 PROCEDURE — 88307 TISSUE EXAM BY PATHOLOGIST: CPT

## 2023-05-26 PROCEDURE — C1889: CPT

## 2023-05-26 DEVICE — CLIP APPLIER COVIDIEN SURGICLIP II 9.75" MEDIUM: Type: IMPLANTABLE DEVICE | Site: RIGHT | Status: FUNCTIONAL

## 2023-05-26 DEVICE — CLIP APPLIER COVIDIEN SURGICLIP 13" LARGE: Type: IMPLANTABLE DEVICE | Site: RIGHT | Status: FUNCTIONAL

## 2023-05-26 DEVICE — VISTASEAL FIBRIN HUMAN 10ML: Type: IMPLANTABLE DEVICE | Site: RIGHT | Status: FUNCTIONAL

## 2023-05-26 RX ORDER — SODIUM CHLORIDE 9 MG/ML
1000 INJECTION, SOLUTION INTRAVENOUS
Refills: 0 | Status: DISCONTINUED | OUTPATIENT
Start: 2023-05-26 | End: 2023-05-30

## 2023-05-26 RX ORDER — DOCUSATE SODIUM 100 MG
1 CAPSULE ORAL
Qty: 30 | Refills: 0
Start: 2023-05-26

## 2023-05-26 RX ORDER — OXYCODONE HYDROCHLORIDE 5 MG/1
1 TABLET ORAL
Qty: 6 | Refills: 0
Start: 2023-05-26

## 2023-05-26 RX ORDER — ONDANSETRON 8 MG/1
4 TABLET, FILM COATED ORAL ONCE
Refills: 0 | Status: DISCONTINUED | OUTPATIENT
Start: 2023-05-26 | End: 2023-05-30

## 2023-05-26 RX ORDER — OXYCODONE HYDROCHLORIDE 5 MG/1
5 TABLET ORAL ONCE
Refills: 0 | Status: DISCONTINUED | OUTPATIENT
Start: 2023-05-26 | End: 2023-05-30

## 2023-05-26 RX ORDER — HYDROMORPHONE HYDROCHLORIDE 2 MG/ML
0.5 INJECTION INTRAMUSCULAR; INTRAVENOUS; SUBCUTANEOUS
Refills: 0 | Status: DISCONTINUED | OUTPATIENT
Start: 2023-05-26 | End: 2023-05-30

## 2023-05-26 RX ORDER — IBUPROFEN 200 MG
1 TABLET ORAL
Qty: 20 | Refills: 0
Start: 2023-05-26

## 2023-05-26 RX ORDER — CEFAZOLIN SODIUM 1 G
1000 VIAL (EA) INJECTION ONCE
Refills: 0 | Status: COMPLETED | OUTPATIENT
Start: 2023-05-26 | End: 2023-05-26

## 2023-05-26 RX ORDER — SODIUM CHLORIDE 9 MG/ML
1000 INJECTION, SOLUTION INTRAVENOUS
Refills: 0 | Status: DISCONTINUED | OUTPATIENT
Start: 2023-05-26 | End: 2023-05-26

## 2023-05-26 NOTE — ASU PREOP CHECKLIST - ASSESSMENT, HISTORY & PHYSICAL COMPLETED AND ON MEDICAL RECORD
General Examination: General appearance: -> alert, pleasant, well-nourished and in no acute distress , alert, pleasant, well-nourished and in no acute distress   Head: -> normocephalic, atraumatic , normocephalic, atraumatic   Eyes: -> normal , normal   Neck / thyroid: -> neck is supple, with full range of motion and no cervical lymphadenopathy , neck is supple, with full range of motion and no cervical lymphadenopathy   Skin: -> skin is warm and dry, with no rashes, good skin turgor and normal hair distribution , skin is warm and dry, with no rashes, good skin turgor and normal hair distribution   Heart: -> regular rate and rhythm without murmurs, gallops, clicks or rubs , regular rate and rhythm without murmurs, gallops, clicks or rubs   Lungs: -> clear to auscultation bilaterally, with good air movement and no rales, rhonchi or wheezes , clear to auscultation bilaterally, with good air movement and no rales, rhonchi or wheezes   Chest: -> chest wall with no costochondral junction tenderness, no rib deformity and normal shape and expansion , chest wall with no costochondral junction tenderness, no rib deformity and normal shape and expansion   Abdomen: -> soft with good bowel sounds, nontender, and no masses or hepatosplenomegaly , negative Bernard's sign , soft with good bowel sounds, nontender, and no masses or hepatosplenomegaly , negative Bernard's sign   Extremities: -> normal extremity with no clubbing, cyanosis or edema , normal extremity with no clubbing, cyanosis or edema   Neurologic: -> alert and oriented , alert and oriented       done

## 2023-05-26 NOTE — ASU DISCHARGE PLAN (ADULT/PEDIATRIC) - NS MD DC FALL RISK RISK
For information on Fall & Injury Prevention, visit: [email or web address] OR  [email or web address] OR  https://www.cdc.gov/steadi/patient.html

## 2023-05-26 NOTE — ASU DISCHARGE PLAN (ADULT/PEDIATRIC) - ASU DC SPECIAL INSTRUCTIONSFT
You may remove the dressing (tape and gauze only) and shower in 48 hours.  Do not remove steri-strips, do not let water hit wounds directly, do not rub incisions.      Do not drive for 24 hours following anesthesia.  Do not drive while taking narcotic pain medication.  Do not drive until pain-free.     No exercise, strenuous physical activity, or heavy lifting (nothing heavier than 5 lbs).      Wear a tight bra for compression of the incision, and apply ice liberally as tolerated (20min on/20min off the incision site for 2-3 hours a day, at least twice a day).      You may resume your usual daily diet as tolerated.     You may perform your usual daily activities as tolerated (e.g. walking, stairclimbing, etc.).      Take Oxycodone as prescribed for severe pain.  Take Ibuprofen as prescribed for moderate pain - take with food.       Follow-up with Dr. Robison in his office next week - call office for appointment if not already made.

## 2023-05-30 ENCOUNTER — APPOINTMENT (OUTPATIENT)
Dept: SURGERY | Facility: CLINIC | Age: 53
End: 2023-05-30
Payer: COMMERCIAL

## 2023-05-30 PROCEDURE — 99024 POSTOP FOLLOW-UP VISIT: CPT

## 2023-05-30 NOTE — PHYSICAL EXAM
[Normocephalic] : normocephalic [Atraumatic] : atraumatic [EOMI] : extra ocular movement intact [PERRL] : pupils equal, round and reactive to light [Supple] : supple [Clear to Auscultation Bilat] : clear to auscultation bilaterally [Examined in the supine and seated position] : examined in the supine and seated position [No Axillary Lymphadenopathy] : no left axillary lymphadenopathy [No Edema] : no edema [No Rashes] : no rashes [No Ulceration] : no ulceration [de-identified] : well developed female in no distress [de-identified] : incision clean and closed, without erythema or drainage.

## 2023-05-31 LAB — SURGICAL PATHOLOGY STUDY: SIGNIFICANT CHANGE UP

## 2023-06-06 ENCOUNTER — APPOINTMENT (OUTPATIENT)
Dept: SURGERY | Facility: CLINIC | Age: 53
End: 2023-06-06
Payer: COMMERCIAL

## 2023-06-06 PROCEDURE — 99024 POSTOP FOLLOW-UP VISIT: CPT

## 2023-06-06 NOTE — ASSESSMENT
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 I have made an appointment for pt to see the plastic surgeon Dr Baeza

## 2023-06-06 NOTE — PHYSICAL EXAM
[Normocephalic] : normocephalic [Atraumatic] : atraumatic [Supple] : supple [No Supraclavicular Adenopathy] : no supraclavicular adenopathy [No Cervical Adenopathy] : no cervical adenopathy [Clear to Auscultation Bilat] : clear to auscultation bilaterally [Normal Sinus Rhythm] : normal sinus rhythm [Examined in the supine and seated position] : examined in the supine and seated position [No Axillary Lymphadenopathy] : no left axillary lymphadenopathy [de-identified] : well developed female in no acute distress [de-identified] : incision clean and closed, without erythema or drainage.

## 2023-06-06 NOTE — HISTORY OF PRESENT ILLNESS
[FreeTextEntry1] : 52 year old white female who felt a right breast lump and went for a core biopsy that showed invasive cancer., She denies any other breast lesions and does not have any breast pain or nipple discharge.\par Risk factors.\par   FHX negative\par   1st period 14\par    1st child 32\par    without prior biopsy\par 1/3/22 ADDENDUM\par Pt had an MRI of her breast and is here to go over results and to discuss receptor status.\par 2/8/23 s/p needle localized right lumpectomy and sentinel node biopsy [facility]\par 2/14/23 Pt is post op and offers no complaints, minimal pain and without drainage.\par 2/16/23 Pt is here to discuss her pathology results.\par 5/2/23 pt was seen by Radiation therapist who recommends she has a wider incision since 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\par 5/26/23 s/p wide excision of right breast cancer [facility]\par 6/6/23 follow up exam and discussion of final pathology results. Pt is here without complaints.

## 2023-06-14 PROBLEM — C50.511 MALIGNANT NEOPLASM OF LOWER-OUTER QUADRANT OF RIGHT FEMALE BREAST: Chronic | Status: ACTIVE | Noted: 2023-05-10

## 2023-06-20 ENCOUNTER — APPOINTMENT (OUTPATIENT)
Dept: SURGERY | Facility: CLINIC | Age: 53
End: 2023-06-20
Payer: COMMERCIAL

## 2023-06-20 PROCEDURE — 99214 OFFICE O/P EST MOD 30 MIN: CPT | Mod: 57

## 2023-06-20 NOTE — ASSESSMENT
[FreeTextEntry1] : I have discussed with pt and  the risks, benefits and options including the fact that there is no evidence of cancer in the left breast and therefore it has a low risk of developing cancer. Pt states that she understand and because of her fear and concern of developing another breast cancer and also since she wants a more balanced out come, she still wants a bilateral mastectomy.

## 2023-06-20 NOTE — PHYSICAL EXAM
[Normocephalic] : normocephalic [Atraumatic] : atraumatic [EOMI] : extra ocular movement intact [PERRL] : pupils equal, round and reactive to light [Sclera nonicteric] : sclera nonicteric [Supple] : supple [No Supraclavicular Adenopathy] : no supraclavicular adenopathy [No Cervical Adenopathy] : no cervical adenopathy [Clear to Auscultation Bilat] : clear to auscultation bilaterally [Normal Sinus Rhythm] : normal sinus rhythm [Examined in the supine and seated position] : examined in the supine and seated position [Asymmetrical] : asymmetrical [No Axillary Lymphadenopathy] : no left axillary lymphadenopathy [de-identified] : well developed female in no acute distress [de-identified] : secondary to lumpectomy-incision clean and closed

## 2023-06-20 NOTE — HISTORY OF PRESENT ILLNESS
[FreeTextEntry1] : 52 year old white female who felt a right breast lump and went for a core biopsy that showed invasive cancer., She denies any other breast lesions and does not have any breast pain or nipple discharge.\par Risk factors.\par   FHX negative\par   1st period 14\par    1st child 32\par    without prior biopsy\par 1/3/22 ADDENDUM\par Pt had an MRI of her breast and is here to go over results and to discuss receptor status.\par 2/8/23 s/p needle localized right lumpectomy and sentinel node biopsy [facility]\par 2/14/23 Pt is post op and offers no complaints, minimal pain and without drainage.\par 2/16/23 Pt is here to discuss her pathology results.\par 5/2/23 pt was seen by Radiation therapist who recommends she has a wider incision since 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\par 5/26/23 s/p wide excision of right breast cancer [facility]\par 6/6/23 follow up exam and discussion of final pathology results. Pt is here without complaints.\par 6/20/23 followup Pt was seen by plastics Dr Arias and is here to discuss her options

## 2023-07-10 RX ORDER — SODIUM CHLORIDE 9 MG/ML
1000 INJECTION, SOLUTION INTRAVENOUS
Refills: 0 | Status: DISCONTINUED | OUTPATIENT
Start: 2023-07-12 | End: 2023-07-12

## 2023-07-11 ENCOUNTER — TRANSCRIPTION ENCOUNTER (OUTPATIENT)
Age: 53
End: 2023-07-11

## 2023-07-11 RX ORDER — SODIUM CHLORIDE 9 MG/ML
1000 INJECTION, SOLUTION INTRAVENOUS
Refills: 0 | Status: DISCONTINUED | OUTPATIENT
Start: 2023-07-12 | End: 2023-07-26

## 2023-07-12 ENCOUNTER — TRANSCRIPTION ENCOUNTER (OUTPATIENT)
Age: 53
End: 2023-07-12

## 2023-07-12 ENCOUNTER — OUTPATIENT (OUTPATIENT)
Dept: OUTPATIENT SERVICES | Facility: HOSPITAL | Age: 53
LOS: 1 days | End: 2023-07-12
Payer: COMMERCIAL

## 2023-07-12 ENCOUNTER — APPOINTMENT (OUTPATIENT)
Dept: SURGERY | Facility: HOSPITAL | Age: 53
End: 2023-07-12

## 2023-07-12 ENCOUNTER — RESULT REVIEW (OUTPATIENT)
Age: 53
End: 2023-07-12

## 2023-07-12 VITALS
HEART RATE: 64 BPM | RESPIRATION RATE: 15 BRPM | TEMPERATURE: 98 F | OXYGEN SATURATION: 95 % | DIASTOLIC BLOOD PRESSURE: 78 MMHG | SYSTOLIC BLOOD PRESSURE: 144 MMHG | WEIGHT: 229.94 LBS

## 2023-07-12 DIAGNOSIS — Z90.49 ACQUIRED ABSENCE OF OTHER SPECIFIED PARTS OF DIGESTIVE TRACT: Chronic | ICD-10-CM

## 2023-07-12 DIAGNOSIS — Z98.890 OTHER SPECIFIED POSTPROCEDURAL STATES: Chronic | ICD-10-CM

## 2023-07-12 DIAGNOSIS — Z98.891 HISTORY OF UTERINE SCAR FROM PREVIOUS SURGERY: Chronic | ICD-10-CM

## 2023-07-12 DIAGNOSIS — D05.10 INTRADUCTAL CARCINOMA IN SITU OF UNSPECIFIED BREAST: ICD-10-CM

## 2023-07-12 DIAGNOSIS — C50.911 MALIGNANT NEOPLASM OF UNSPECIFIED SITE OF RIGHT FEMALE BREAST: ICD-10-CM

## 2023-07-12 LAB
ABO RH CONFIRMATION: SIGNIFICANT CHANGE UP
BLD GP AB SCN SERPL QL: SIGNIFICANT CHANGE UP
HCG UR QL: NEGATIVE — SIGNIFICANT CHANGE UP

## 2023-07-12 PROCEDURE — 88309 TISSUE EXAM BY PATHOLOGIST: CPT | Mod: 26

## 2023-07-12 PROCEDURE — 19303 MAST SIMPLE COMPLETE: CPT | Mod: LT,58

## 2023-07-12 PROCEDURE — 19307 MAST MOD RAD: CPT | Mod: RT,58

## 2023-07-12 PROCEDURE — 88307 TISSUE EXAM BY PATHOLOGIST: CPT | Mod: 26

## 2023-07-12 PROCEDURE — 19303 MAST SIMPLE COMPLETE: CPT | Mod: AS,LT,XS

## 2023-07-12 PROCEDURE — 19307 MAST MOD RAD: CPT | Mod: AS,RT

## 2023-07-12 DEVICE — XPND TISS DERMASPAN LOW POLE TEX MH 600-720CC SMOOTH: Type: IMPLANTABLE DEVICE | Status: FUNCTIONAL

## 2023-07-12 DEVICE — CYTAL WOUND MATRIX 3-LAYER 10X15CM: Type: IMPLANTABLE DEVICE | Status: FUNCTIONAL

## 2023-07-12 RX ORDER — SODIUM CHLORIDE 9 MG/ML
1000 INJECTION, SOLUTION INTRAVENOUS
Refills: 0 | Status: DISCONTINUED | OUTPATIENT
Start: 2023-07-12 | End: 2023-07-12

## 2023-07-12 RX ORDER — OXYCODONE HYDROCHLORIDE 5 MG/1
5 TABLET ORAL ONCE
Refills: 0 | Status: DISCONTINUED | OUTPATIENT
Start: 2023-07-12 | End: 2023-07-12

## 2023-07-12 RX ORDER — CEFAZOLIN SODIUM 1 G
2000 VIAL (EA) INJECTION ONCE
Refills: 0 | Status: COMPLETED | OUTPATIENT
Start: 2023-07-12 | End: 2023-07-12

## 2023-07-12 RX ORDER — ONDANSETRON 8 MG/1
4 TABLET, FILM COATED ORAL EVERY 6 HOURS
Refills: 0 | Status: DISCONTINUED | OUTPATIENT
Start: 2023-07-12 | End: 2023-07-26

## 2023-07-12 RX ORDER — OXYCODONE HYDROCHLORIDE 5 MG/1
5 TABLET ORAL EVERY 4 HOURS
Refills: 0 | Status: DISCONTINUED | OUTPATIENT
Start: 2023-07-12 | End: 2023-07-12

## 2023-07-12 RX ORDER — ONDANSETRON 8 MG/1
4 TABLET, FILM COATED ORAL ONCE
Refills: 0 | Status: DISCONTINUED | OUTPATIENT
Start: 2023-07-12 | End: 2023-07-12

## 2023-07-12 RX ORDER — HYDROMORPHONE HYDROCHLORIDE 2 MG/ML
1 INJECTION INTRAMUSCULAR; INTRAVENOUS; SUBCUTANEOUS EVERY 4 HOURS
Refills: 0 | Status: DISCONTINUED | OUTPATIENT
Start: 2023-07-12 | End: 2023-07-12

## 2023-07-12 RX ORDER — HYDROMORPHONE HYDROCHLORIDE 2 MG/ML
0.5 INJECTION INTRAMUSCULAR; INTRAVENOUS; SUBCUTANEOUS
Refills: 0 | Status: DISCONTINUED | OUTPATIENT
Start: 2023-07-12 | End: 2023-07-12

## 2023-07-12 RX ORDER — ACETAMINOPHEN 500 MG
1000 TABLET ORAL EVERY 8 HOURS
Refills: 0 | Status: DISCONTINUED | OUTPATIENT
Start: 2023-07-12 | End: 2023-07-26

## 2023-07-12 RX ORDER — CEFAZOLIN SODIUM 1 G
2000 VIAL (EA) INJECTION EVERY 8 HOURS
Refills: 0 | Status: DISCONTINUED | OUTPATIENT
Start: 2023-07-12 | End: 2023-07-26

## 2023-07-12 RX ADMIN — SODIUM CHLORIDE 75 MILLILITER(S): 9 INJECTION, SOLUTION INTRAVENOUS at 13:44

## 2023-07-12 RX ADMIN — SODIUM CHLORIDE 60 MILLILITER(S): 9 INJECTION, SOLUTION INTRAVENOUS at 07:30

## 2023-07-12 RX ADMIN — Medication 100 MILLIGRAM(S): at 21:08

## 2023-07-12 RX ADMIN — Medication 1000 MILLIGRAM(S): at 21:34

## 2023-07-12 RX ADMIN — Medication 1000 MILLIGRAM(S): at 22:04

## 2023-07-12 NOTE — ASU DISCHARGE PLAN (ADULT/PEDIATRIC) - CARE PROVIDER_API CALL
Dawit Baeza  Plastic Surgery  [address], [address]  [place] [address]  Phone: [phone]  Fax: [phone]  Follow Up Time:     Brendon Robison  Surgery  [address]  [place] [address]  Phone: [phone]  Fax: [phone]  Follow Up Time:

## 2023-07-12 NOTE — BRIEF OPERATIVE NOTE - NSICDXBRIEFPROCEDURE_GEN_ALL_CORE_FT
PROCEDURES:  Immediate reconstruction of breast 12-Jul-2023 13:13:18  Huma Monte  Free nipple graft of both breasts 12-Jul-2023 13:13:42  Huma Monte  Reconstruction of breast using local fasciocutaneous flap 12-Jul-2023 13:14:08  Huma Monte

## 2023-07-12 NOTE — ASU PREOP CHECKLIST - MUPIRONCIN COMMENTS
Per PST use new soap to wash at home pt did not come to PST. Per pt used brand new antibacterial soap at 7/12/23 at 5:20AM today only

## 2023-07-12 NOTE — ASU DISCHARGE PLAN (ADULT/PEDIATRIC) - ASU DC SPECIAL INSTRUCTIONSFT
keep dressing on all time, can take Motrin and Tylenol for pain management. monitor the drain output and record

## 2023-07-12 NOTE — BRIEF OPERATIVE NOTE - OPERATION/FINDINGS
Immediate reconstruction of bilateral breasts s/p bilateral mastectomy  Fasciocutaneous flaps of bilateral breasts, acell sheets, and expander placement bilaterally

## 2023-07-13 ENCOUNTER — TRANSCRIPTION ENCOUNTER (OUTPATIENT)
Age: 53
End: 2023-07-13

## 2023-07-13 VITALS
OXYGEN SATURATION: 91 % | SYSTOLIC BLOOD PRESSURE: 126 MMHG | DIASTOLIC BLOOD PRESSURE: 78 MMHG | HEART RATE: 69 BPM | RESPIRATION RATE: 18 BRPM | TEMPERATURE: 98 F

## 2023-07-13 LAB
ANION GAP SERPL CALC-SCNC: 6 MMOL/L — SIGNIFICANT CHANGE UP (ref 5–17)
BUN SERPL-MCNC: 11 MG/DL — SIGNIFICANT CHANGE UP (ref 7–23)
CALCIUM SERPL-MCNC: 8.4 MG/DL — LOW (ref 8.5–10.1)
CHLORIDE SERPL-SCNC: 111 MMOL/L — HIGH (ref 96–108)
CO2 SERPL-SCNC: 27 MMOL/L — SIGNIFICANT CHANGE UP (ref 22–31)
CREAT SERPL-MCNC: 0.75 MG/DL — SIGNIFICANT CHANGE UP (ref 0.5–1.3)
EGFR: 95 ML/MIN/1.73M2 — SIGNIFICANT CHANGE UP
GLUCOSE SERPL-MCNC: 130 MG/DL — HIGH (ref 70–99)
HCT VFR BLD CALC: 31.4 % — LOW (ref 34.5–45)
HGB BLD-MCNC: 9.8 G/DL — LOW (ref 11.5–15.5)
MCHC RBC-ENTMCNC: 26.6 PG — LOW (ref 27–34)
MCHC RBC-ENTMCNC: 31.2 GM/DL — LOW (ref 32–36)
MCV RBC AUTO: 85.1 FL — SIGNIFICANT CHANGE UP (ref 80–100)
NRBC # BLD: 0 /100 WBCS — SIGNIFICANT CHANGE UP (ref 0–0)
PLATELET # BLD AUTO: 315 K/UL — SIGNIFICANT CHANGE UP (ref 150–400)
POTASSIUM SERPL-MCNC: 4.1 MMOL/L — SIGNIFICANT CHANGE UP (ref 3.5–5.3)
POTASSIUM SERPL-SCNC: 4.1 MMOL/L — SIGNIFICANT CHANGE UP (ref 3.5–5.3)
RBC # BLD: 3.69 M/UL — LOW (ref 3.8–5.2)
RBC # FLD: 16.3 % — HIGH (ref 10.3–14.5)
SODIUM SERPL-SCNC: 144 MMOL/L — SIGNIFICANT CHANGE UP (ref 135–145)
WBC # BLD: 10.2 K/UL — SIGNIFICANT CHANGE UP (ref 3.8–10.5)
WBC # FLD AUTO: 10.2 K/UL — SIGNIFICANT CHANGE UP (ref 3.8–10.5)

## 2023-07-13 PROCEDURE — 86900 BLOOD TYPING SEROLOGIC ABO: CPT

## 2023-07-13 PROCEDURE — 80048 BASIC METABOLIC PNL TOTAL CA: CPT

## 2023-07-13 PROCEDURE — 85027 COMPLETE CBC AUTOMATED: CPT

## 2023-07-13 PROCEDURE — 19350 NIPPLE/AREOLA RECONSTRUCTION: CPT | Mod: 50

## 2023-07-13 PROCEDURE — 36415 COLL VENOUS BLD VENIPUNCTURE: CPT

## 2023-07-13 PROCEDURE — C1789: CPT

## 2023-07-13 PROCEDURE — 99261: CPT

## 2023-07-13 PROCEDURE — 19380 REVJ RECONSTRUCTED BREAST: CPT | Mod: 50

## 2023-07-13 PROCEDURE — 19357 TISS XPNDR PLMT BRST RCNSTJ: CPT | Mod: 50

## 2023-07-13 PROCEDURE — 86850 RBC ANTIBODY SCREEN: CPT

## 2023-07-13 PROCEDURE — 15777 ACELLULAR DERM MATRIX IMPLT: CPT

## 2023-07-13 PROCEDURE — 88309 TISSUE EXAM BY PATHOLOGIST: CPT

## 2023-07-13 PROCEDURE — 88307 TISSUE EXAM BY PATHOLOGIST: CPT

## 2023-07-13 PROCEDURE — 86901 BLOOD TYPING SEROLOGIC RH(D): CPT

## 2023-07-13 PROCEDURE — 81025 URINE PREGNANCY TEST: CPT

## 2023-07-13 PROCEDURE — 19303 MAST SIMPLE COMPLETE: CPT | Mod: 50

## 2023-07-13 PROCEDURE — 15734 MUSCLE-SKIN GRAFT TRUNK: CPT

## 2023-07-13 RX ADMIN — Medication 1000 MILLIGRAM(S): at 05:34

## 2023-07-13 RX ADMIN — Medication 100 MILLIGRAM(S): at 03:55

## 2023-07-13 RX ADMIN — Medication 1000 MILLIGRAM(S): at 06:04

## 2023-07-13 NOTE — PATIENT PROFILE ADULT - ARRIVAL FROM
36 y.o male w/ PMH of HIV on genvoya (dx '17), HTN, HLD , anal fissure, presents today for F/U after ED visit on Nov 2022 for abd pain.   Reports intermittent periumbilical abd pain, occcasional radiation of pain downwards. He also reports of acid reflux, using tums, provides relief. No n/v. Took dicyclomine which does help w/ pain. CT scan completed in ED was unrevealing.   Admits of changes in bowel habits for 3wks, from consitpation to diarrhea, vice versa, along w/ straining. Unsure of any provocative factors. BM: up to 4 per day. 1 episode of dark brown stool. Occasional hematochezia, but does have hx of anal fissure. No rectal pain currently.  Saw [facility] who planned to perform a colonoscopy but was unable to get a procedure appt with them.
Home

## 2023-07-13 NOTE — DISCHARGE NOTE NURSING/CASE MANAGEMENT/SOCIAL WORK - PATIENT PORTAL LINK FT
You can access the FollowMyHealth Patient Portal offered by [facility] by registering at the following website: [email or web address]. By joining [facility]’s FollowMyHealth portal, you will also be able to view your health information using other applications (apps) compatible with our system.

## 2023-07-13 NOTE — PROGRESS NOTE ADULT - SUBJECTIVE AND OBJECTIVE BOX
SUBJECTIVE:  Patient seen and examined at bedside.  No overnight events.  Patient reports 1-2/10 postop pain.  Passing flatus. No BM yet.  Voiding, ambulating and tolerating a regular diet. No fevers/chills, nausea/vomiting.    VITALS  Vital Signs Last 24 Hrs  T(C): 36.6 (13 Jul 2023 04:51), Max: 37.2 (12 Jul 2023 14:58)  T(F): 97.9 (13 Jul 2023 04:51), Max: 98.9 (12 Jul 2023 14:58)  HR: 69 (13 Jul 2023 04:51) (69 - 82)  BP: 126/78 (13 Jul 2023 04:51) (105/65 - 126/78)  RR: 18 (13 Jul 2023 04:51) (14 - 18)  SpO2: 91% (13 Jul 2023 04:51) (91% - 99%)    Parameters below as of 13 Jul 2023 04:51  Patient On (Oxygen Delivery Method): room air    PHYSICAL EXAM  GENERAL:  Well-nourished, well-developed female lying comfortably in bed in [facility].  HEENT:  NC/AT. Sclera white. Mucous membranes moist.  CARDIO:  Regular rate and rhythm.  RESPIRATORY:  Nonlabored breathing, no accessory muscle use.  ABDOMEN:  Soft, nondistended, nontender.   BREASTS: Compression bra in place, clean/dry. Right and Left CHYNA drain with minimal serosanguinous output.  EXTREMITIES: No calf tenderness bilaterally.  SKIN:  No jaundice, pallor, or cyanosis  NEURO:  A&O x 3    INTAKE & OUTPUT  I&O's Summary    12 Jul 2023 07:01  -  13 Jul 2023 07:00  --------------------------------------------------------  IN: 0 mL / OUT: 2345 mL / NET: -2345 mL    I&O's Detail    12 Jul 2023 07:01  -  13 Jul 2023 07:00  --------------------------------------------------------  IN:  Total IN: 0 mL    OUT:    Bulb (mL): 45 mL    Bulb (mL): 100 mL    Voided (mL): 2200 mL  Total OUT: 2345 mL    Total NET: -2345 mL    MEDICATIONS  MEDICATIONS  (STANDING):  acetaminophen     Tablet .. 1000 milliGRAM(s) Oral every 8 hours  ceFAZolin   IVPB 2000 milliGRAM(s) IV Intermittent every 8 hours  lactated ringers. 1000 milliLiter(s) (60 mL/Hr) IV Continuous <Continuous>    MEDICATIONS  (PRN):  HYDROmorphone  Injectable 1 milliGRAM(s) IV Push every 4 hours PRN Severe Pain (7 - 10)  ondansetron Injectable 4 milliGRAM(s) IV Push every 6 hours PRN Nausea  oxyCODONE    IR 5 milliGRAM(s) Oral every 4 hours PRN Moderate Pain (4 - 6)    ASSESSMENT & PLAN   53 year old female POD#1 s/p bilateral mastectomy with reconstruction.    - Continue regular diet  - Continue compression bra  - Monitor drain output  - Encourage ambulation  - Pain control PRN  - Incentive spirometry  - Plan for d/c later today  - Case discussed with Dr. Robison
POST OPERATIVE NOTE  Patient: CAMI ROBERTSON 53y (1970) Female   MRN: [id number]  Visit: 07-12-23 Outpatient  Date: 07-12-23 @ 16:01    Procedure: S/P bilateral mastectomy & breast reconstruction    Subjective: Patient seen and examined at bedside. Reports mild chest/incisional pain & difficulty taking deep breaths 2/2 pain. Tolerating diet, ambulating, voiding.  Denies dizziness, chest pain, palpitations, SOB, nausea, vomiting, abdominal pain, or urinary complaints.    Objective:  Vitals: T(F): 98.9 (07-12-23 @ 14:58), Max: 98.9 (07-12-23 @ 14:58)  HR: 76 (07-12-23 @ 14:58)  BP: 108/65 (07-12-23 @ 14:58) (105/65 - 144/78)  RR: 16 (07-12-23 @ 14:58)  SpO2: 92% (07-12-23 @ 14:58)  Vent Settings:     In:   IV Fluids: lactated ringers. 1000 milliLiter(s) (75 mL/Hr) IV Continuous <Continuous>    Out:     Physical Exam:  GENERAL: NAD, resting comfortably in bed  HEENT:  Atraumatic, normocephalic; conjunctiva and sclera clear; neck supple  CHEST/LUNG: good inspiratory effort, Surgical dressings clean, dry, intact. B/L drains - both w/ sanguinous drainage; surgical bra in place  HEART: Rate & rhythm regular; +S1/S2  ABDOMEN: Soft, nondistended, nontender to palpation, + BS  EXTREMITIES:  2+ peripheral pulses, no clubbing, cyanosis, or edema  PSYCH: A&O x3  NEUROLOGY: Motor & sensory grossly intact  SKIN: Warm & dry    Medications: [Standing]  acetaminophen     Tablet .. 1000 milliGRAM(s) Oral every 8 hours  ceFAZolin   IVPB 2000 milliGRAM(s) IV Intermittent every 8 hours  HYDROmorphone  Injectable 1 milliGRAM(s) IV Push every 4 hours PRN  lactated ringers. 1000 milliLiter(s) IV Continuous <Continuous>  ondansetron Injectable 4 milliGRAM(s) IV Push every 6 hours PRN  oxyCODONE    IR 5 milliGRAM(s) Oral every 4 hours PRN    Medications: [PRN]  acetaminophen     Tablet .. 1000 milliGRAM(s) Oral every 8 hours  ceFAZolin   IVPB 2000 milliGRAM(s) IV Intermittent every 8 hours  HYDROmorphone  Injectable 1 milliGRAM(s) IV Push every 4 hours PRN  lactated ringers. 1000 milliLiter(s) IV Continuous <Continuous>  ondansetron Injectable 4 milliGRAM(s) IV Push every 6 hours PRN  oxyCODONE    IR 5 milliGRAM(s) Oral every 4 hours PRN    Imaging:  No post-op imaging studies    Assessment:  54 y/o Female patient S/P bilateral mastectomy & breast reconstruction    Plan:  - Continue current care  - Continue diet  - Continue Ancef  - Pain control PRN, Zofran PRN, supportive care  - Incentive spirometry, encourage ambulation, SCDs  - Follow up AM labs  - Will continue to monitor    Surgical Team: [id number]

## 2023-07-17 LAB — SURGICAL PATHOLOGY STUDY: SIGNIFICANT CHANGE UP

## 2023-08-01 ENCOUNTER — APPOINTMENT (OUTPATIENT)
Dept: SURGERY | Facility: CLINIC | Age: 53
End: 2023-08-01
Payer: COMMERCIAL

## 2023-08-01 PROCEDURE — 99024 POSTOP FOLLOW-UP VISIT: CPT

## 2023-09-05 ENCOUNTER — APPOINTMENT (OUTPATIENT)
Dept: SURGERY | Facility: CLINIC | Age: 53
End: 2023-09-05
Payer: COMMERCIAL

## 2023-09-05 PROCEDURE — 99024 POSTOP FOLLOW-UP VISIT: CPT

## 2023-09-05 NOTE — PHYSICAL EXAM
[Normocephalic] : normocephalic [Symmetrical] : symmetrical [No Edema] : no edema [No Rashes] : no rashes [No Ulceration] : no ulceration [de-identified] : Incisions clean and closed [de-identified] : Incisions clean and closed

## 2023-09-05 NOTE — HISTORY OF PRESENT ILLNESS
[FreeTextEntry1] : This 52 yo W F s/p Mastectomy Bilateral (Dr. Robison) with Reconstruction (Dr. Baeza) 07/12/2023.  Patient is feeling well.  Patient denies fever, chills or wound drainage.  Patient is being followed regularly by Dr. Baeza (Plastics)  h/o Invasive Ductal CA Right.

## 2023-10-20 ENCOUNTER — OUTPATIENT (OUTPATIENT)
Dept: OUTPATIENT SERVICES | Facility: HOSPITAL | Age: 53
LOS: 1 days | End: 2023-10-20
Payer: COMMERCIAL

## 2023-10-20 VITALS
OXYGEN SATURATION: 97 % | RESPIRATION RATE: 16 BRPM | HEART RATE: 73 BPM | WEIGHT: 225.09 LBS | DIASTOLIC BLOOD PRESSURE: 88 MMHG | TEMPERATURE: 98 F | SYSTOLIC BLOOD PRESSURE: 145 MMHG

## 2023-10-20 DIAGNOSIS — Z90.13 ACQUIRED ABSENCE OF BILATERAL BREASTS AND NIPPLES: Chronic | ICD-10-CM

## 2023-10-20 DIAGNOSIS — Z98.890 OTHER SPECIFIED POSTPROCEDURAL STATES: Chronic | ICD-10-CM

## 2023-10-20 DIAGNOSIS — C50.511 MALIGNANT NEOPLASM OF LOWER-OUTER QUADRANT OF RIGHT FEMALE BREAST: ICD-10-CM

## 2023-10-20 DIAGNOSIS — Z01.818 ENCOUNTER FOR OTHER PREPROCEDURAL EXAMINATION: ICD-10-CM

## 2023-10-20 DIAGNOSIS — Z90.49 ACQUIRED ABSENCE OF OTHER SPECIFIED PARTS OF DIGESTIVE TRACT: Chronic | ICD-10-CM

## 2023-10-20 DIAGNOSIS — C50.911 MALIGNANT NEOPLASM OF UNSPECIFIED SITE OF RIGHT FEMALE BREAST: ICD-10-CM

## 2023-10-20 DIAGNOSIS — Z98.891 HISTORY OF UTERINE SCAR FROM PREVIOUS SURGERY: Chronic | ICD-10-CM

## 2023-10-20 LAB
ALBUMIN SERPL ELPH-MCNC: 3.7 G/DL — SIGNIFICANT CHANGE UP (ref 3.3–5)
ALBUMIN SERPL ELPH-MCNC: 3.7 G/DL — SIGNIFICANT CHANGE UP (ref 3.3–5)
ALP SERPL-CCNC: 78 U/L — SIGNIFICANT CHANGE UP (ref 40–120)
ALP SERPL-CCNC: 78 U/L — SIGNIFICANT CHANGE UP (ref 40–120)
ALT FLD-CCNC: 141 U/L — HIGH (ref 12–78)
ALT FLD-CCNC: 141 U/L — HIGH (ref 12–78)
ANION GAP SERPL CALC-SCNC: 8 MMOL/L — SIGNIFICANT CHANGE UP (ref 5–17)
ANION GAP SERPL CALC-SCNC: 8 MMOL/L — SIGNIFICANT CHANGE UP (ref 5–17)
APPEARANCE UR: CLEAR — SIGNIFICANT CHANGE UP
APPEARANCE UR: CLEAR — SIGNIFICANT CHANGE UP
APTT BLD: 29 SEC — SIGNIFICANT CHANGE UP (ref 24.5–35.6)
APTT BLD: 29 SEC — SIGNIFICANT CHANGE UP (ref 24.5–35.6)
AST SERPL-CCNC: 74 U/L — HIGH (ref 15–37)
AST SERPL-CCNC: 74 U/L — HIGH (ref 15–37)
BILIRUB SERPL-MCNC: 0.6 MG/DL — SIGNIFICANT CHANGE UP (ref 0.2–1.2)
BILIRUB SERPL-MCNC: 0.6 MG/DL — SIGNIFICANT CHANGE UP (ref 0.2–1.2)
BILIRUB UR-MCNC: NEGATIVE — SIGNIFICANT CHANGE UP
BILIRUB UR-MCNC: NEGATIVE — SIGNIFICANT CHANGE UP
BLD GP AB SCN SERPL QL: SIGNIFICANT CHANGE UP
BLD GP AB SCN SERPL QL: SIGNIFICANT CHANGE UP
BUN SERPL-MCNC: 14 MG/DL — SIGNIFICANT CHANGE UP (ref 7–23)
BUN SERPL-MCNC: 14 MG/DL — SIGNIFICANT CHANGE UP (ref 7–23)
CALCIUM SERPL-MCNC: 9.7 MG/DL — SIGNIFICANT CHANGE UP (ref 8.5–10.1)
CALCIUM SERPL-MCNC: 9.7 MG/DL — SIGNIFICANT CHANGE UP (ref 8.5–10.1)
CHLORIDE SERPL-SCNC: 107 MMOL/L — SIGNIFICANT CHANGE UP (ref 96–108)
CHLORIDE SERPL-SCNC: 107 MMOL/L — SIGNIFICANT CHANGE UP (ref 96–108)
CO2 SERPL-SCNC: 28 MMOL/L — SIGNIFICANT CHANGE UP (ref 22–31)
CO2 SERPL-SCNC: 28 MMOL/L — SIGNIFICANT CHANGE UP (ref 22–31)
COLOR SPEC: YELLOW — SIGNIFICANT CHANGE UP
COLOR SPEC: YELLOW — SIGNIFICANT CHANGE UP
CREAT SERPL-MCNC: 1.1 MG/DL — SIGNIFICANT CHANGE UP (ref 0.5–1.3)
CREAT SERPL-MCNC: 1.1 MG/DL — SIGNIFICANT CHANGE UP (ref 0.5–1.3)
DIFF PNL FLD: NEGATIVE — SIGNIFICANT CHANGE UP
DIFF PNL FLD: NEGATIVE — SIGNIFICANT CHANGE UP
EGFR: 60 ML/MIN/1.73M2 — SIGNIFICANT CHANGE UP
EGFR: 60 ML/MIN/1.73M2 — SIGNIFICANT CHANGE UP
EPI CELLS # UR: PRESENT
EPI CELLS # UR: PRESENT
GLUCOSE SERPL-MCNC: 69 MG/DL — LOW (ref 70–99)
GLUCOSE SERPL-MCNC: 69 MG/DL — LOW (ref 70–99)
GLUCOSE UR QL: NEGATIVE MG/DL — SIGNIFICANT CHANGE UP
GLUCOSE UR QL: NEGATIVE MG/DL — SIGNIFICANT CHANGE UP
HCG UR QL: NEGATIVE — SIGNIFICANT CHANGE UP
HCG UR QL: NEGATIVE — SIGNIFICANT CHANGE UP
HCT VFR BLD CALC: 32.3 % — LOW (ref 34.5–45)
HCT VFR BLD CALC: 32.3 % — LOW (ref 34.5–45)
HGB BLD-MCNC: 10.3 G/DL — LOW (ref 11.5–15.5)
HGB BLD-MCNC: 10.3 G/DL — LOW (ref 11.5–15.5)
INR BLD: 0.86 RATIO — SIGNIFICANT CHANGE UP (ref 0.85–1.18)
INR BLD: 0.86 RATIO — SIGNIFICANT CHANGE UP (ref 0.85–1.18)
KETONES UR-MCNC: ABNORMAL MG/DL
KETONES UR-MCNC: ABNORMAL MG/DL
LEUKOCYTE ESTERASE UR-ACNC: ABNORMAL
LEUKOCYTE ESTERASE UR-ACNC: ABNORMAL
MCHC RBC-ENTMCNC: 27.3 PG — SIGNIFICANT CHANGE UP (ref 27–34)
MCHC RBC-ENTMCNC: 27.3 PG — SIGNIFICANT CHANGE UP (ref 27–34)
MCHC RBC-ENTMCNC: 31.9 GM/DL — LOW (ref 32–36)
MCHC RBC-ENTMCNC: 31.9 GM/DL — LOW (ref 32–36)
MCV RBC AUTO: 85.7 FL — SIGNIFICANT CHANGE UP (ref 80–100)
MCV RBC AUTO: 85.7 FL — SIGNIFICANT CHANGE UP (ref 80–100)
NITRITE UR-MCNC: NEGATIVE — SIGNIFICANT CHANGE UP
NITRITE UR-MCNC: NEGATIVE — SIGNIFICANT CHANGE UP
NRBC # BLD: 0 /100 WBCS — SIGNIFICANT CHANGE UP (ref 0–0)
NRBC # BLD: 0 /100 WBCS — SIGNIFICANT CHANGE UP (ref 0–0)
PH UR: 6 — SIGNIFICANT CHANGE UP (ref 5–8)
PH UR: 6 — SIGNIFICANT CHANGE UP (ref 5–8)
PLATELET # BLD AUTO: 273 K/UL — SIGNIFICANT CHANGE UP (ref 150–400)
PLATELET # BLD AUTO: 273 K/UL — SIGNIFICANT CHANGE UP (ref 150–400)
POTASSIUM SERPL-MCNC: 3.5 MMOL/L — SIGNIFICANT CHANGE UP (ref 3.5–5.3)
POTASSIUM SERPL-MCNC: 3.5 MMOL/L — SIGNIFICANT CHANGE UP (ref 3.5–5.3)
POTASSIUM SERPL-SCNC: 3.5 MMOL/L — SIGNIFICANT CHANGE UP (ref 3.5–5.3)
POTASSIUM SERPL-SCNC: 3.5 MMOL/L — SIGNIFICANT CHANGE UP (ref 3.5–5.3)
PROT SERPL-MCNC: 8 G/DL — SIGNIFICANT CHANGE UP (ref 6–8.3)
PROT SERPL-MCNC: 8 G/DL — SIGNIFICANT CHANGE UP (ref 6–8.3)
PROT UR-MCNC: SIGNIFICANT CHANGE UP MG/DL
PROT UR-MCNC: SIGNIFICANT CHANGE UP MG/DL
PROTHROM AB SERPL-ACNC: 10.1 SEC — SIGNIFICANT CHANGE UP (ref 9.5–13)
PROTHROM AB SERPL-ACNC: 10.1 SEC — SIGNIFICANT CHANGE UP (ref 9.5–13)
RBC # BLD: 3.77 M/UL — LOW (ref 3.8–5.2)
RBC # BLD: 3.77 M/UL — LOW (ref 3.8–5.2)
RBC # FLD: 21.9 % — HIGH (ref 10.3–14.5)
RBC # FLD: 21.9 % — HIGH (ref 10.3–14.5)
RBC CASTS # UR COMP ASSIST: 0 /HPF — SIGNIFICANT CHANGE UP (ref 0–4)
RBC CASTS # UR COMP ASSIST: 0 /HPF — SIGNIFICANT CHANGE UP (ref 0–4)
SODIUM SERPL-SCNC: 143 MMOL/L — SIGNIFICANT CHANGE UP (ref 135–145)
SODIUM SERPL-SCNC: 143 MMOL/L — SIGNIFICANT CHANGE UP (ref 135–145)
SP GR SPEC: 1.02 — SIGNIFICANT CHANGE UP (ref 1–1.03)
SP GR SPEC: 1.02 — SIGNIFICANT CHANGE UP (ref 1–1.03)
UROBILINOGEN FLD QL: 0.2 MG/DL — SIGNIFICANT CHANGE UP (ref 0.2–1)
UROBILINOGEN FLD QL: 0.2 MG/DL — SIGNIFICANT CHANGE UP (ref 0.2–1)
WBC # BLD: 3.05 K/UL — LOW (ref 3.8–10.5)
WBC # BLD: 3.05 K/UL — LOW (ref 3.8–10.5)
WBC # FLD AUTO: 3.05 K/UL — LOW (ref 3.8–10.5)
WBC # FLD AUTO: 3.05 K/UL — LOW (ref 3.8–10.5)
WBC UR QL: 5 /HPF — SIGNIFICANT CHANGE UP (ref 0–5)
WBC UR QL: 5 /HPF — SIGNIFICANT CHANGE UP (ref 0–5)

## 2023-10-20 PROCEDURE — 81001 URINALYSIS AUTO W/SCOPE: CPT

## 2023-10-20 PROCEDURE — 87086 URINE CULTURE/COLONY COUNT: CPT

## 2023-10-20 PROCEDURE — 86901 BLOOD TYPING SEROLOGIC RH(D): CPT

## 2023-10-20 PROCEDURE — 36415 COLL VENOUS BLD VENIPUNCTURE: CPT

## 2023-10-20 PROCEDURE — 93005 ELECTROCARDIOGRAM TRACING: CPT

## 2023-10-20 PROCEDURE — 93010 ELECTROCARDIOGRAM REPORT: CPT

## 2023-10-20 PROCEDURE — 85730 THROMBOPLASTIN TIME PARTIAL: CPT

## 2023-10-20 PROCEDURE — 80053 COMPREHEN METABOLIC PANEL: CPT

## 2023-10-20 PROCEDURE — 85610 PROTHROMBIN TIME: CPT

## 2023-10-20 PROCEDURE — 81025 URINE PREGNANCY TEST: CPT

## 2023-10-20 PROCEDURE — 85027 COMPLETE CBC AUTOMATED: CPT

## 2023-10-20 PROCEDURE — G0463: CPT

## 2023-10-20 PROCEDURE — 86900 BLOOD TYPING SEROLOGIC ABO: CPT

## 2023-10-20 PROCEDURE — 86850 RBC ANTIBODY SCREEN: CPT

## 2023-10-20 NOTE — H&P PST ADULT - SKIN/BREAST
Transitional Care Team: [facility] Discharge Note    Date of Assessment: 11/05/19  Time of Assessment:  12:37 PM      Bere Mendoza is a 80 y.o. female inpatient at [facility] with pneumonia on  10/29. Assessment & Plan   Pt A+O. Denies dyspnea with nasal cannula in place. Being discharged to home- has accepted [facility]. Has follow up appointments with PCP. Once cleared from pneumonia, plans to begin treatment for the MDS            Current Code Status:  full    Medication Reconciliation:  was performed. Can patient afford medications:  yes    Who manages medications at home family  Emergency Contact spouse Ayana Mclean [phone]    Chart reviewed by me and HUG (Healthy Understanding of Goals) program introduced to patient/family. The Transitional Care Team bridges the gaps in care and education surrounding discharge from the acute care facility. The objective is to empower the patient and family in taking a proactive role in the task of preventing readmission within the first thirty days after discharge from the acute care setting, The team is also involved in the efforts to reduce readmission to the acute care setting after stabilization and discharge from the acute care environment either to skilled nursing facilities or community. Discharge With The Following Arrangements in place:    Future Appointments   Date Time Provider Anju Dickson   11/7/2019 10:30 AM Surinder Jesus MD 3 Chris Resendiz   12/9/2019  8:30 AM [facility] 2 [facility]   12/12/2019 11:40 AM Ximena Mcneil DO NEUM [facility]HE[facility] SCHED       Non-[facility] follow up appointment(s): none yet    [facility] call information given to pt, discussed option to call 9 am to 9 PM daily     Patient / Family was instructed on specific signs/symptoms to look for with regards to worsening of their medical conditions. Learning was assessed using teach back.      The patient / family have added upcoming appointment dates to their [facility] Calendar prior to discharge. Patient education focused on readmission zones as described as: The Red Zone: High risk for readmission, days 1-21   The Yellow Zone: Moderate  risk for readmission, days 22-29   The Green Zone: Lower risk for readmission, days 30 and after    The [facility] Team will follow patient from a distance while inpatient as well as be available for further transition disposition as needed. The [facility] TEAM will continue to offer support during the 30- 90 day discharge from acute care setting. Notified Ambulatory {Blank Single Select Template:20061[de-identified] ,[facility] RN.       Signed By: Alisson Hall RN     November 5, 2019 Arlene Steward MD details…

## 2023-10-20 NOTE — H&P PST ADULT - PROBLEM SELECTOR PLAN 2
Medical clearance needed as per surgeon. CBC, Comprehensive panel, PT/PTT, UA, Urine culture and EKG ordered. Pre-op instructions and surgical scrubs given and pt verbalized understanding.

## 2023-10-20 NOTE — H&P PST ADULT - NSICDXPASTSURGICALHX_GEN_ALL_CORE_FT
PAST SURGICAL HISTORY:  History of lymph node dissection of right axilla     S/P appendectomy     S/P      S/P lumpectomy, right breast     S/P mastectomy, bilateral

## 2023-10-20 NOTE — H&P PST ADULT - HISTORY OF PRESENT ILLNESS
53 y/o healthy female with c/o feeling a lump in the right breast in late October 2022, had a mammo, sonogram, MRI and a biopsy done in the past few weeks. Diagnosed with ductal carcinoma. Seen by Dr Robison, scheduled for right breast needle LOC excision of mass w/sentinel node biopsy on 1/9/23. Pre op testing today.  ADDENDUM: Pt's history reviewed as above. S/p right breast lumpectomy on 1/9/2023 and diagnosed with DCIS. 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 Pt denies right breast mass, rash and pain. Pt s/p wide excision right breast mass on 5/26/2023 with subsequent bilateral mastectomy on 7/12/23 with right axillary lymph node dissection. Pt denies rash and pain to breast area. Pt electing for revision of bilateral breast reconstruction with fat grafting and breast implants on 10/30/23.

## 2023-10-20 NOTE — H&P PST ADULT - ATTENDING COMMENTS
Patient seen and examined at the bedside in preoperative holding. Discussed upcoming procedure, all questions and concerns answered, and patient is agreeable to scheduled procedure.

## 2023-10-20 NOTE — H&P PST ADULT - LYMPHATIC
posterior cervical L/posterior cervical R/anterior cervical L/anterior cervical R/supraclavicular L/supraclavicular R/axillary R

## 2023-10-21 LAB
CULTURE RESULTS: SIGNIFICANT CHANGE UP
CULTURE RESULTS: SIGNIFICANT CHANGE UP
SPECIMEN SOURCE: SIGNIFICANT CHANGE UP
SPECIMEN SOURCE: SIGNIFICANT CHANGE UP

## 2023-10-27 RX ORDER — SODIUM CHLORIDE 9 MG/ML
1000 INJECTION, SOLUTION INTRAVENOUS
Refills: 0 | Status: DISCONTINUED | OUTPATIENT
Start: 2023-10-30 | End: 2023-10-30

## 2023-10-27 NOTE — ASU PATIENT PROFILE, ADULT - FALL HARM RISK - CONCLUSION
Called patient with the following results per PCP:    \" Message from BRISA Maurer sent at 12/27/2022  8:20 AM CST -----  Please call Raul. Please let him know that I received his CT of the abdomen and pelvis. We did this, because of his left lower abdominal pain. The CT did not show any signs of diverticulitis which was my concern.  His kidneys looked normal. His pancreas was normal. He did have a small hital hernia but this is not surgical and would not cause his symptoms. If he is continuously having abdominal pain, I would recommend a colonoscopy at this time. Would he like me to put a referral in for him.\"    Patient verbalized he is feeling better and does not wish to proceed with a colonoscopy.      Universal Safety Interventions

## 2023-10-30 ENCOUNTER — OUTPATIENT (OUTPATIENT)
Dept: OUTPATIENT SERVICES | Facility: HOSPITAL | Age: 53
LOS: 1 days | End: 2023-10-30
Payer: COMMERCIAL

## 2023-10-30 ENCOUNTER — TRANSCRIPTION ENCOUNTER (OUTPATIENT)
Age: 53
End: 2023-10-30

## 2023-10-30 VITALS
RESPIRATION RATE: 15 BRPM | TEMPERATURE: 98 F | OXYGEN SATURATION: 95 % | HEART RATE: 64 BPM | DIASTOLIC BLOOD PRESSURE: 78 MMHG | SYSTOLIC BLOOD PRESSURE: 135 MMHG | WEIGHT: 225.09 LBS

## 2023-10-30 DIAGNOSIS — C50.911 MALIGNANT NEOPLASM OF UNSPECIFIED SITE OF RIGHT FEMALE BREAST: ICD-10-CM

## 2023-10-30 DIAGNOSIS — Z98.890 OTHER SPECIFIED POSTPROCEDURAL STATES: Chronic | ICD-10-CM

## 2023-10-30 DIAGNOSIS — Z90.49 ACQUIRED ABSENCE OF OTHER SPECIFIED PARTS OF DIGESTIVE TRACT: Chronic | ICD-10-CM

## 2023-10-30 DIAGNOSIS — Z90.13 ACQUIRED ABSENCE OF BILATERAL BREASTS AND NIPPLES: Chronic | ICD-10-CM

## 2023-10-30 DIAGNOSIS — Z98.891 HISTORY OF UTERINE SCAR FROM PREVIOUS SURGERY: Chronic | ICD-10-CM

## 2023-10-30 LAB
HCG UR QL: NEGATIVE — SIGNIFICANT CHANGE UP
HCG UR QL: NEGATIVE — SIGNIFICANT CHANGE UP

## 2023-10-30 PROCEDURE — 88300 SURGICAL PATH GROSS: CPT | Mod: 26,59

## 2023-10-30 PROCEDURE — 88304 TISSUE EXAM BY PATHOLOGIST: CPT | Mod: 26

## 2023-10-30 DEVICE — IMPLANTABLE DEVICE: Type: IMPLANTABLE DEVICE | Site: BILATERAL, | Status: FUNCTIONAL

## 2023-10-30 RX ORDER — OXYCODONE HYDROCHLORIDE 5 MG/1
5 TABLET ORAL ONCE
Refills: 0 | Status: DISCONTINUED | OUTPATIENT
Start: 2023-10-30 | End: 2023-10-30

## 2023-10-30 RX ORDER — HYDROMORPHONE HYDROCHLORIDE 2 MG/ML
0.5 INJECTION INTRAMUSCULAR; INTRAVENOUS; SUBCUTANEOUS
Refills: 0 | Status: DISCONTINUED | OUTPATIENT
Start: 2023-10-30 | End: 2023-10-30

## 2023-10-30 RX ORDER — ONDANSETRON 8 MG/1
4 TABLET, FILM COATED ORAL ONCE
Refills: 0 | Status: DISCONTINUED | OUTPATIENT
Start: 2023-10-30 | End: 2023-10-30

## 2023-10-30 RX ORDER — KETOROLAC TROMETHAMINE 30 MG/ML
30 SYRINGE (ML) INJECTION EVERY 6 HOURS
Refills: 0 | Status: DISCONTINUED | OUTPATIENT
Start: 2023-10-30 | End: 2023-10-30

## 2023-10-30 RX ORDER — CEFAZOLIN SODIUM 1 G
2000 VIAL (EA) INJECTION ONCE
Refills: 0 | Status: COMPLETED | OUTPATIENT
Start: 2023-10-30 | End: 2023-10-30

## 2023-10-30 RX ORDER — SODIUM CHLORIDE 9 MG/ML
1000 INJECTION, SOLUTION INTRAVENOUS
Refills: 0 | Status: DISCONTINUED | OUTPATIENT
Start: 2023-10-30 | End: 2023-10-30

## 2023-10-30 RX ORDER — ANASTROZOLE 1 MG/1
1 TABLET ORAL DAILY
Refills: 0 | Status: DISCONTINUED | OUTPATIENT
Start: 2023-10-30 | End: 2023-11-13

## 2023-10-30 RX ORDER — ONDANSETRON 8 MG/1
4 TABLET, FILM COATED ORAL ONCE
Refills: 0 | Status: DISCONTINUED | OUTPATIENT
Start: 2023-10-30 | End: 2023-11-13

## 2023-10-30 RX ORDER — CEFAZOLIN SODIUM 1 G
1000 VIAL (EA) INJECTION EVERY 8 HOURS
Refills: 0 | Status: DISCONTINUED | OUTPATIENT
Start: 2023-10-30 | End: 2023-11-13

## 2023-10-30 RX ADMIN — Medication 30 MILLIGRAM(S): at 23:32

## 2023-10-30 RX ADMIN — Medication 100 MILLIGRAM(S): at 19:39

## 2023-10-30 RX ADMIN — SODIUM CHLORIDE 75 MILLILITER(S): 9 INJECTION, SOLUTION INTRAVENOUS at 11:23

## 2023-10-30 RX ADMIN — HYDROMORPHONE HYDROCHLORIDE 0.5 MILLIGRAM(S): 2 INJECTION INTRAMUSCULAR; INTRAVENOUS; SUBCUTANEOUS at 18:54

## 2023-10-30 NOTE — BRIEF OPERATIVE NOTE - DISPOSITION
Discharge Care Plan


Call your Pediatrician if


* Excessive somnolence (sleepiness) and difficult to arouse


* Excessive irritability and difficult to console


* Rectal temperature greater than or equal to 100.4


* Rectal temperature less than or equal to 97


* No bowel movement for more than 24 hours


Goals to Promote Your Health


* To maintain your infant's health at optimal level


* To prevent worsening of your infant's condition 


* To prevent complications for your infant


Directions to Meet Your Goals


*** Give your infant's medications as prescribed


*** Feed your infant every 2-4 hours


*** Follow activity as directed for your infant


*** Do not shake your infant


*** Maintain neck support


*** Do not sleep in bed with your infant


*** Keep your infant away from second hand smoke





*** Keep your infant's appointments as scheduled


*** Keep your infant's immunizations and boosters up to date


*** If symptoms worsen call your infant's PCP/Pediatrician; if no PCP/

Pediatrician go to Urgent Care Center or Emergency Room ***


***Call the 24-hour crisis hotline for domestic abuse at 1-686.487.2305***











Beba Kenny MD Mar 26, 2018 17:15 pacu and floor

## 2023-10-30 NOTE — BRIEF OPERATIVE NOTE - NSICDXBRIEFPROCEDURE_GEN_ALL_CORE_FT
PROCEDURES:  Revision of reconstruction of breast with insertion of breast implant 30-Oct-2023 17:49:46  Huma Monte  Revision of reconstructed breast 30-Oct-2023 17:49:56  Huma Monte  Removal of tissue expander and insertion of breast implant 30-Oct-2023 17:50:11  Huma Monte  Fat grafting 30-Oct-2023 17:50:21  Huma Monte  Capsulotomy, breast 30-Oct-2023 17:50:43  Huma Monte  Capsulectomy, breast 30-Oct-2023 17:50:53  Huma Monte  Capsulorrhaphy, breast 30-Oct-2023 17:51:05  Huma Monte

## 2023-10-30 NOTE — BRIEF OPERATIVE NOTE - OPERATION/FINDINGS
Revision of bilateral breast reconstruction s/p right breast cancer with removal of b/l breast tissue expanders, placement of b/l breast implants, fat grafting to b/l breasts, and fat grafting to right axilla.   Capsulectomy, capsulotomy, and capsulorrhaphy

## 2023-10-30 NOTE — ASU PREOP CHECKLIST - NS PREOP CHK HIBICLENS NA
Post-Discharge Transitional Care Management Services      Tata Juan   YOB: 1933    Date of Office Visit:  2/19/2018  Date of Hospital Admission: 2/9/18  Date of Hospital Discharge: 2/13/18  [facility] Risk Score [risk of hospital readmission >=10  medium risk (chance of readmission ~ 12%) >14  high risk (chance of readmission ~18%)]:Risk Score: 23.5    Care management risk score Rising risk (score 2-5) and Complex Care (Scores >=6): 3       Patient Active Problem List   Diagnosis    CAD (coronary artery disease)    Hypertension    Hyperlipidemia    Ascending aortic aneurysm ([address]    Cardiomyopathy ([address]    Heart imaging    Shortness of breath    Dizzy spells    Bradycardia    Abnormal EKG    Severe left ventricular systolic dysfunction    S/P PTCA (percutaneous transluminal coronary angioplasty)    Acute on chronic systolic heart failure, NYHA class 3 ([address]    TIA (transient ischemic attack)    Aortic stenosis, mild    S/P cardiac cath    Cerebrovascular accident (CVA) ([address]    Hypertensive urgency    Renal insufficiency    Coronary artery disease involving native coronary artery of native heart without angina pectoris    PAF (paroxysmal atrial fibrillation) ([address]    Biventricular automatic implantable cardioverter defibrillator in situ    Epistaxis    Essential hypertension    AICD (automatic cardioverter/defibrillator) present    PVC (premature ventricular contraction)    CHF (congestive heart failure), NYHA class I, acute on chronic, combined ([address]    Acute on chronic congestive heart failure ([address]    CKD (chronic kidney disease) stage 3, GFR 30-59 ml/min    Acute systolic heart failure (HCC)       Allergies   Allergen Reactions    Pcn [Penicillins] Hives    Plavix [Clopidogrel]      Blood in stool       Medications listed as ordered at the time of discharge from hospital   Janie BERNARD   Home Medication Instructions DALLIN:    Printed on:02/19/18 6492 Medication Information                      apixaban (ELIQUIS) 2.5 MG TABS tablet  Take 1 tablet by mouth 2 times daily             atorvastatin (LIPITOR) 80 MG tablet  Take 1 tablet by mouth nightly             [employer] MELATONIN 3 MG TABS tablet  TAKE 1 TABLET BY MOUTH NIGHTLY AS NEEDED (INSOMNIA)             furosemide (LASIX) 20 MG tablet  Take 1 tablet by mouth daily             nitroGLYCERIN (NITROLINGUAL) 0.4 MG/SPRAY spray  Place 1 spray under the tongue every 5 minutes as needed for Chest pain             pantoprazole (PROTONIX) 40 MG tablet  TAKE 1 TABLET BY MOUTH EVERY MORNING (BEFORE BREAKFAST)             sacubitril-valsartan (ENTRESTO) 24-26 MG per tablet  Take 1 tablet by mouth 2 times daily                   Medications marked \"taking\" at this time  No outpatient prescriptions have been marked as taking for the 2/19/18 encounter (Office Visit) with Yaakov Scheuermann, MD.        Medications patient taking as of now reconciled against medications ordered at time of hospital discharge: yes    Vitals:    02/19/18 1502   BP: 112/78   Site: Left Arm   Position: Sitting   Cuff Size: Medium Adult   Pulse: 87   Resp: 14   Temp: 97.5 °F (36.4 °C)   TempSrc: Temporal   SpO2: 95%   Weight: 179 lb (81.2 kg)   Height: 5' 6\" (1.676 m)     Body mass index is 28.89 kg/m². Wt Readings from Last 3 Encounters:   02/19/18 179 lb (81.2 kg)   02/13/18 175 lb 4.8 oz (79.5 kg)   12/14/17 177 lb (80.3 kg)     BP Readings from Last 3 Encounters:   02/19/18 112/78   02/13/18 100/62   12/14/17 102/82        Inpatient course: Discharge summary reviewed- see chart.     Chief Complaint   Patient presents with    Follow-Up from Hospital     CHF , SOB      History of Present illness - Follow up of Hospital diagnosis(es): CHF, HTN, PAF      Non face to face  following discharge, date last encounter closed (first attempt may have been earlier): *No documented post hospital discharge outreach found in the last 14 days    Call initiated 2 business days of discharge: *No response recorded in the last 14 days     Interval history/Current status: improved    Physical Exam:  General Appearance: alert and oriented to person, place and time, well developed and well- nourished, in no acute distress  Skin: warm and dry, no rash or erythema  Head: normocephalic and atraumatic  Eyes: pupils equal, round, and reactive to light, extraocular eye movements intact, conjunctivae normal  ENT: tympanic membrane, external ear and ear canal normal bilaterally, nose without deformity, nasal mucosa and turbinates normal without polyps  Neck: supple and non-tender without mass, no thyromegaly or thyroid nodules, no cervical lymphadenopathy  Pulmonary/Chest: clear to auscultation bilaterally- no wheezes, rales or rhonchi, normal air movement, no respiratory distress  Cardiovascular: normal rate, regular rhythm, normal S1 and S2, no murmurs, rubs, clicks, or gallops, distal pulses intact, no carotid bruits  Abdomen: soft, non-tender, non-distended, normal bowel sounds, no masses or organomegaly  Extremities: no cyanosis, clubbing or edema  Musculoskeletal: normal range of motion, no joint swelling, deformity or tenderness  Neurologic: reflexes normal and symmetric, no cranial nerve deficit, gait, coordination and speech normal        Assessment/Plan:  Ming Richmond was seen today for follow-up from hospital.    Diagnoses and all orders for this visit:    Primary insomnia          Medical Decision Making: moderate complexity #1:

## 2023-10-31 ENCOUNTER — TRANSCRIPTION ENCOUNTER (OUTPATIENT)
Age: 53
End: 2023-10-31

## 2023-10-31 VITALS
SYSTOLIC BLOOD PRESSURE: 108 MMHG | TEMPERATURE: 98 F | HEART RATE: 71 BPM | OXYGEN SATURATION: 92 % | DIASTOLIC BLOOD PRESSURE: 70 MMHG | RESPIRATION RATE: 17 BRPM

## 2023-10-31 PROCEDURE — 11406 EXC TR-EXT B9+MARG >4.0 CM: CPT

## 2023-10-31 PROCEDURE — 11970 RPLCMT TISS XPNDR PERM IMPLT: CPT | Mod: 50

## 2023-10-31 PROCEDURE — 15734 MUSCLE-SKIN GRAFT TRUNK: CPT

## 2023-10-31 PROCEDURE — 15771 GRFG AUTOL FAT LIPO 50 CC/<: CPT

## 2023-10-31 PROCEDURE — C1789: CPT

## 2023-10-31 PROCEDURE — 88304 TISSUE EXAM BY PATHOLOGIST: CPT

## 2023-10-31 PROCEDURE — 88300 SURGICAL PATH GROSS: CPT

## 2023-10-31 PROCEDURE — 81025 URINE PREGNANCY TEST: CPT

## 2023-10-31 PROCEDURE — 15772 GRFG AUTOL FAT LIPO EA ADDL: CPT

## 2023-10-31 RX ADMIN — Medication 100 MILLIGRAM(S): at 04:30

## 2023-10-31 RX ADMIN — Medication 30 MILLIGRAM(S): at 00:15

## 2023-10-31 NOTE — ASU DISCHARGE PLAN (ADULT/PEDIATRIC) - FOLLOW UP APPOINTMENTS
[facility], Ambulatory Surgery Unit [facility], Ambulatory Surgery Unit [facility], Ambulatory Surgery Unit

## 2023-10-31 NOTE — ASU DISCHARGE PLAN (ADULT/PEDIATRIC) - NS MD DC FALL RISK RISK
For information on Fall & Injury Prevention, visit: https://www.[facility]/news/fall-prevention-protects-and-maintains-health-and-mobility OR  https://www.[facility]/news/fall-prevention-tips-to-avoid-injury OR  https://www.cdc.gov/steadi/patient.html For information on Fall & Injury Prevention, visit: https://www.[facility]/news/fall-prevention-protects-and-maintains-health-and-mobility OR  https://www.[facility]/news/fall-prevention-tips-to-avoid-injury OR  https://www.cdc.gov/steadi/patient.html For information on Fall & Injury Prevention, visit: https://www.[facility]/news/fall-prevention-protects-and-maintains-health-and-mobility OR  https://www.[facility]/news/fall-prevention-tips-to-avoid-injury OR  https://www.cdc.gov/steadi/patient.html

## 2023-10-31 NOTE — ASU DISCHARGE PLAN (ADULT/PEDIATRIC) - ASU DC SPECIAL INSTRUCTIONSFT
Do not remove any dressings, do not shower  Take your antibiotic as prescribed with food and a probiotic

## 2023-10-31 NOTE — CARE COORDINATION ASSESSMENT. - NSCAREPROVIDERS_GEN_ALL_CORE_FT
CARE PROVIDERS:  Administration: Herber Sheriff  Admitting: Dawit Baeza  Attending: Dawit Baeza  Case Management: Mattie Bey  Consultant: Dawit Sosa  Nurse: Joan Frances  Nurse: Darion Gallagher  Ordered: ADM, User  Outpatient Provider: Yemi Foreman  Override: Mi Holder  Override: Joan Frances  PCA/Nursing Assistant: Kunal Phan  PCA/Nursing Assistant: Juanita Servin  Primary Team: Behrens, Stuart  Primary Team: Huma Monte  Primary Team: Peggy Downs  Registered Dietitian: Page Rodriguez

## 2023-10-31 NOTE — CARE COORDINATION ASSESSMENT. - NSPASTMEDSURGHISTORY_GEN_ALL_CORE_FT
PAST MEDICAL & SURGICAL HISTORY:  S/P appendectomy      S/P       Primary malignant neoplasm of lower outer quadrant of right female breast      History of lymph node dissection of right axilla      S/P lumpectomy, right breast      S/P mastectomy, bilateral

## 2023-10-31 NOTE — DISCHARGE NOTE NURSING/CASE MANAGEMENT/SOCIAL WORK - NSDCPEFALRISK_GEN_ALL_CORE
For information on Fall & Injury Prevention, visit: https://www.[facility]/news/fall-prevention-protects-and-maintains-health-and-mobility OR  https://www.[facility]/news/fall-prevention-tips-to-avoid-injury OR  https://www.cdc.gov/steadi/patient.html

## 2023-10-31 NOTE — PROGRESS NOTE ADULT - SUBJECTIVE AND OBJECTIVE BOX
POST-OPERATIVE NOTE    Subjective:   53y Female s/p Revision and reconstruction of b/l Breast with insertion of breast implants POD #0 seen and examined at bedside . Denies pain,  nausea or any other complaints. . Voiding appropriately     Vital Signs Last 24 Hrs  T(C): 36.6 (31 Oct 2023 05:09), Max: 37.1 (30 Oct 2023 18:49)  T(F): 97.9 (31 Oct 2023 05:09), Max: 98.8 (30 Oct 2023 18:49)  HR: 71 (31 Oct 2023 05:09) (62 - 74)  BP: 118/75 (31 Oct 2023 05:09) (111/68 - 140/78)  BP(mean): --  RR: 17 (31 Oct 2023 05:09) (11 - 17)  SpO2: 92% (31 Oct 2023 05:09) (92% - 99%)    Parameters below as of 31 Oct 2023 05:09  Patient On (Oxygen Delivery Method): nasal cannula      I&O's Detail      Physical Exam:  General: NAD, somnolent  EYES: EOMI, PERRLA, conjunctiva and sclera clear  Pulmonary: Nonlabored breathing, no respiratory distress, clear breath sounds  Breast  :  dressing c/d/i  Cardiovascular: NSR, S1, S2  Abdominal: soft, NT/ND,  Extremities: no edema, no calf tenderness, distal pulses are palpable     LABS:              MEDICATIONS  (STANDING):  anastrozole 1 milliGRAM(s) Oral daily  ceFAZolin   IVPB 1000 milliGRAM(s) IV Intermittent every 8 hours    MEDICATIONS  (PRN):  HYDROmorphone  Injectable 0.5 milliGRAM(s) IV Push every 10 minutes PRN Severe Pain (7 - 10)  ketorolac   Injectable 30 milliGRAM(s) IV Push every 6 hours PRN Moderate Pain (4 - 6)  ondansetron Injectable 4 milliGRAM(s) IV Push once PRN Nausea and/or Vomiting      Assessment:  The patient is a 53y Female who is s/p Revision and reconstruction of b/l Breast with insertion of breast implants POD #0. Stable several hours after surgery     Plan:  - Pain control prn  -Resume diet, Adv diet as tolerated  -Dressing change prn  - OOB as tolerated  - F/u AM labs  - DVT ppx

## 2023-10-31 NOTE — DISCHARGE NOTE NURSING/CASE MANAGEMENT/SOCIAL WORK - NSDCPETBCESMAN_GEN_ALL_CORE
Critical Care Critical Care Endocrinology Intervent Cardiology Endocrinology Endocrinology If you are a smoker, it is important for your health to stop smoking. Please be aware that second hand smoke is also harmful. Intervent Cardiology Intervent Cardiology CT Surgery Critical Care Endocrinology Endocrinology CT Surgery CT Surgery CT Surgery CT Surgery CT Surgery CT Surgery CT Surgery

## 2023-10-31 NOTE — DISCHARGE NOTE NURSING/CASE MANAGEMENT/SOCIAL WORK - PATIENT PORTAL LINK FT
You can access the FollowMyHealth Patient Portal offered by [facility] by registering at the following website: [email or web address]. By joining [employer]’s FollowMyHealth portal, you will also be able to view your health information using other applications (apps) compatible with our system.

## 2023-10-31 NOTE — CARE COORDINATION ASSESSMENT. - OTHER PERTINENT REFERRAL INFORMATION
Met with the patient at the bedside. Explained the role of case management/discharge planning. Patient verbalized understanding. Provided contact information and discharge planning packet. Patient resides with her spouse and children and was independent PTA. Patient underwent B/L breast revision. No anticipated skilled discharge needs noted. Will remain available ot patient and family

## 2023-10-31 NOTE — ASU DISCHARGE PLAN (ADULT/PEDIATRIC) - PROCEDURE
Revision of bilateral breast reconstruction with removal of tissue expanders and placement of b/l breast implants, fat grafting to b/l breasts

## 2023-11-01 LAB
SURGICAL PATHOLOGY STUDY: SIGNIFICANT CHANGE UP
SURGICAL PATHOLOGY STUDY: SIGNIFICANT CHANGE UP

## 2023-12-11 ENCOUNTER — APPOINTMENT (OUTPATIENT)
Dept: SURGERY | Facility: CLINIC | Age: 53
End: 2023-12-11
Payer: COMMERCIAL

## 2023-12-11 DIAGNOSIS — Z90.13 ACQUIRED ABSENCE OF BILATERAL BREASTS AND NIPPLES: ICD-10-CM

## 2023-12-11 PROCEDURE — 99212 OFFICE O/P EST SF 10 MIN: CPT

## 2023-12-20 ENCOUNTER — APPOINTMENT (OUTPATIENT)
Dept: OBGYN | Facility: CLINIC | Age: 53
End: 2023-12-20
Payer: COMMERCIAL

## 2023-12-20 ENCOUNTER — RESULT CHARGE (OUTPATIENT)
Age: 53
End: 2023-12-20

## 2023-12-20 VITALS
HEIGHT: 68 IN | HEART RATE: 78 BPM | WEIGHT: 220 LBS | SYSTOLIC BLOOD PRESSURE: 126 MMHG | DIASTOLIC BLOOD PRESSURE: 78 MMHG | BODY MASS INDEX: 33.34 KG/M2 | OXYGEN SATURATION: 98 %

## 2023-12-20 DIAGNOSIS — C50.511 MALIGNANT NEOPLASM OF LOWER-OUTER QUADRANT OF RIGHT FEMALE BREAST: ICD-10-CM

## 2023-12-20 DIAGNOSIS — B37.31 ACUTE CANDIDIASIS OF VULVA AND VAGINA: ICD-10-CM

## 2023-12-20 DIAGNOSIS — Z13.71 ENCOUNTER FOR NONPROCREATIVE SCREENING FOR GENETIC DISEASE CARRIER STATUS: ICD-10-CM

## 2023-12-20 DIAGNOSIS — N95.2 POSTMENOPAUSAL ATROPHIC VAGINITIS: ICD-10-CM

## 2023-12-20 DIAGNOSIS — Z01.411 ENCOUNTER FOR GYNECOLOGICAL EXAMINATION (GENERAL) (ROUTINE) WITH ABNORMAL FINDINGS: ICD-10-CM

## 2023-12-20 PROCEDURE — 99214 OFFICE O/P EST MOD 30 MIN: CPT

## 2023-12-20 RX ORDER — TERCONAZOLE 8 MG/G
0.8 CREAM VAGINAL
Qty: 1 | Refills: 0 | Status: ACTIVE | COMMUNITY
Start: 2023-12-20 | End: 1900-01-01

## 2023-12-20 RX ORDER — ANASTROZOLE TABLETS 1 MG/1
1 TABLET ORAL
Refills: 0 | Status: ACTIVE | COMMUNITY

## 2023-12-20 RX ORDER — FLUCONAZOLE 150 MG/1
150 TABLET ORAL
Qty: 2 | Refills: 4 | Status: ACTIVE | COMMUNITY
Start: 2023-12-20 | End: 1900-01-01

## 2023-12-20 RX ORDER — ABEMACICLIB 150 MG/1
150 TABLET ORAL
Refills: 0 | Status: ACTIVE | COMMUNITY

## 2023-12-20 NOTE — PHYSICAL EXAM
[Chaperone Present] : A chaperone was present in the examining room during all aspects of the physical examination [Normal] : uterus [Discharge] : a  ~M vaginal discharge was present [White] : white [Frothy] : frothy [Dry Mucosa] : dry mucosa [No Bleeding] : there was no active vaginal bleeding [Uterine Adnexae] : were not tender and not enlarged [FreeTextEntry1] : This note was written by Crystal Garcia on 12/20/2023, acting as a scribe for Dr. Arden Maravilla MD. All medic record entries were at my, Dr. Arden Maravilla MD, direction and personally dictated by me in 12/20/2023. I have personally reviewed the chart and agree that the record accurately reflects my personal performance of the history, physical exam, assessment, and plan.  [Atrophy] : atrophy [Cystocele] : a cystocele [Rectocele] : a rectocele [FreeTextEntry4] : yeast infection

## 2023-12-20 NOTE — CHIEF COMPLAINT
[Urgent Visit] : Urgent Visit [FreeTextEntry1] : pt is here for pelvic check. pt is experiencing burning sensation vaginally.

## 2023-12-20 NOTE — COUNSELING
[Other ___] : [unfilled] [FreeTextEntry2] : Vaginal atrophy noted and options were discussed.  Patient is not a candidate currently for estrogen therapy.

## 2024-02-08 ENCOUNTER — OUTPATIENT (OUTPATIENT)
Dept: OUTPATIENT SERVICES | Facility: HOSPITAL | Age: 54
LOS: 1 days | End: 2024-02-08
Payer: COMMERCIAL

## 2024-02-08 VITALS
RESPIRATION RATE: 16 BRPM | TEMPERATURE: 98 F | WEIGHT: 218.92 LBS | HEIGHT: 68 IN | DIASTOLIC BLOOD PRESSURE: 83 MMHG | HEART RATE: 98 BPM | OXYGEN SATURATION: 98 % | SYSTOLIC BLOOD PRESSURE: 130 MMHG

## 2024-02-08 DIAGNOSIS — Z98.890 OTHER SPECIFIED POSTPROCEDURAL STATES: Chronic | ICD-10-CM

## 2024-02-08 DIAGNOSIS — Z90.49 ACQUIRED ABSENCE OF OTHER SPECIFIED PARTS OF DIGESTIVE TRACT: Chronic | ICD-10-CM

## 2024-02-08 DIAGNOSIS — Z98.891 HISTORY OF UTERINE SCAR FROM PREVIOUS SURGERY: Chronic | ICD-10-CM

## 2024-02-08 DIAGNOSIS — Z01.818 ENCOUNTER FOR OTHER PREPROCEDURAL EXAMINATION: ICD-10-CM

## 2024-02-08 DIAGNOSIS — C50.911 MALIGNANT NEOPLASM OF UNSPECIFIED SITE OF RIGHT FEMALE BREAST: ICD-10-CM

## 2024-02-08 DIAGNOSIS — Z90.13 ACQUIRED ABSENCE OF BILATERAL BREASTS AND NIPPLES: Chronic | ICD-10-CM

## 2024-02-08 DIAGNOSIS — C50.919 MALIGNANT NEOPLASM OF UNSPECIFIED SITE OF UNSPECIFIED FEMALE BREAST: ICD-10-CM

## 2024-02-08 LAB
ALBUMIN SERPL ELPH-MCNC: 3.3 G/DL — SIGNIFICANT CHANGE UP (ref 3.3–5)
ALP SERPL-CCNC: 78 U/L — SIGNIFICANT CHANGE UP (ref 40–120)
ALT FLD-CCNC: 35 U/L — SIGNIFICANT CHANGE UP (ref 12–78)
ANION GAP SERPL CALC-SCNC: 8 MMOL/L — SIGNIFICANT CHANGE UP (ref 5–17)
APPEARANCE UR: ABNORMAL
APTT BLD: 28.3 SEC — SIGNIFICANT CHANGE UP (ref 24.5–35.6)
AST SERPL-CCNC: 22 U/L — SIGNIFICANT CHANGE UP (ref 15–37)
BILIRUB SERPL-MCNC: 0.6 MG/DL — SIGNIFICANT CHANGE UP (ref 0.2–1.2)
BILIRUB UR-MCNC: NEGATIVE — SIGNIFICANT CHANGE UP
BUN SERPL-MCNC: 14 MG/DL — SIGNIFICANT CHANGE UP (ref 7–23)
CALCIUM SERPL-MCNC: 9.4 MG/DL — SIGNIFICANT CHANGE UP (ref 8.5–10.1)
CHLORIDE SERPL-SCNC: 109 MMOL/L — HIGH (ref 96–108)
CO2 SERPL-SCNC: 25 MMOL/L — SIGNIFICANT CHANGE UP (ref 22–31)
COLOR SPEC: SIGNIFICANT CHANGE UP
CREAT SERPL-MCNC: 1 MG/DL — SIGNIFICANT CHANGE UP (ref 0.5–1.3)
DIFF PNL FLD: NEGATIVE — SIGNIFICANT CHANGE UP
EGFR: 67 ML/MIN/1.73M2 — SIGNIFICANT CHANGE UP
GLUCOSE SERPL-MCNC: 112 MG/DL — HIGH (ref 70–99)
GLUCOSE UR QL: NEGATIVE MG/DL — SIGNIFICANT CHANGE UP
HCG UR QL: NEGATIVE — SIGNIFICANT CHANGE UP
HCT VFR BLD CALC: 29.8 % — LOW (ref 34.5–45)
HGB BLD-MCNC: 10 G/DL — LOW (ref 11.5–15.5)
INR BLD: 0.82 RATIO — LOW (ref 0.85–1.18)
KETONES UR-MCNC: ABNORMAL MG/DL
LEUKOCYTE ESTERASE UR-ACNC: ABNORMAL
MCHC RBC-ENTMCNC: 33.6 GM/DL — SIGNIFICANT CHANGE UP (ref 32–36)
MCHC RBC-ENTMCNC: 33.7 PG — SIGNIFICANT CHANGE UP (ref 27–34)
MCV RBC AUTO: 100.3 FL — HIGH (ref 80–100)
NITRITE UR-MCNC: NEGATIVE — SIGNIFICANT CHANGE UP
NRBC # BLD: 0 /100 WBCS — SIGNIFICANT CHANGE UP (ref 0–0)
PH UR: 6 — SIGNIFICANT CHANGE UP (ref 5–8)
PLATELET # BLD AUTO: 188 K/UL — SIGNIFICANT CHANGE UP (ref 150–400)
POTASSIUM SERPL-MCNC: 3.8 MMOL/L — SIGNIFICANT CHANGE UP (ref 3.5–5.3)
POTASSIUM SERPL-SCNC: 3.8 MMOL/L — SIGNIFICANT CHANGE UP (ref 3.5–5.3)
PROT SERPL-MCNC: 7.7 G/DL — SIGNIFICANT CHANGE UP (ref 6–8.3)
PROT UR-MCNC: NEGATIVE MG/DL — SIGNIFICANT CHANGE UP
PROTHROM AB SERPL-ACNC: 9.7 SEC — SIGNIFICANT CHANGE UP (ref 9.5–13)
RBC # BLD: 2.97 M/UL — LOW (ref 3.8–5.2)
RBC # FLD: 13.8 % — SIGNIFICANT CHANGE UP (ref 10.3–14.5)
SODIUM SERPL-SCNC: 142 MMOL/L — SIGNIFICANT CHANGE UP (ref 135–145)
SP GR SPEC: 1.02 — SIGNIFICANT CHANGE UP (ref 1–1.03)
UROBILINOGEN FLD QL: 1 MG/DL — SIGNIFICANT CHANGE UP (ref 0.2–1)
WBC # BLD: 3.26 K/UL — LOW (ref 3.8–10.5)
WBC # FLD AUTO: 3.26 K/UL — LOW (ref 3.8–10.5)

## 2024-02-08 PROCEDURE — 86900 BLOOD TYPING SEROLOGIC ABO: CPT

## 2024-02-08 PROCEDURE — 36415 COLL VENOUS BLD VENIPUNCTURE: CPT

## 2024-02-08 PROCEDURE — G0463: CPT

## 2024-02-08 PROCEDURE — 85027 COMPLETE CBC AUTOMATED: CPT

## 2024-02-08 PROCEDURE — 86901 BLOOD TYPING SEROLOGIC RH(D): CPT

## 2024-02-08 PROCEDURE — 85610 PROTHROMBIN TIME: CPT

## 2024-02-08 PROCEDURE — 81025 URINE PREGNANCY TEST: CPT

## 2024-02-08 PROCEDURE — 81001 URINALYSIS AUTO W/SCOPE: CPT

## 2024-02-08 PROCEDURE — 80053 COMPREHEN METABOLIC PANEL: CPT

## 2024-02-08 PROCEDURE — 86850 RBC ANTIBODY SCREEN: CPT

## 2024-02-08 PROCEDURE — 87086 URINE CULTURE/COLONY COUNT: CPT

## 2024-02-08 PROCEDURE — 85730 THROMBOPLASTIN TIME PARTIAL: CPT

## 2024-02-08 NOTE — H&P PST ADULT - NSICDXPASTSURGICALHX_GEN_ALL_CORE_FT
PAST SURGICAL HISTORY:  History of lymph node dissection of right axilla     S/P appendectomy     S/P      S/P lumpectomy, right breast     S/P mastectomy, bilateral     Status post bilateral breast reconstruction

## 2024-02-08 NOTE — H&P PST ADULT - PROBLEM SELECTOR PLAN 1
Replacement of right breast implant -Fascia fat grafting to right breast on 2/20/24  Labs- CBC, CMP, PT/PTT, UCG, T&S, UA, Urine C&S  Pre op instructions discussed including skin prep  Pre op PCP evaluation

## 2024-02-08 NOTE — H&P PST ADULT - HISTORY OF PRESENT ILLNESS
52 y/o healthy female with h/o right breast DCIS, s/p right breast lumpectomy on 1/9/2023, right breast lumpectomy on 1/9/2023 & bilateral breast revision reconstruction with implants in 10/2023  Pt c/o shifting of right breast implant x 2 months- had f/u surgical consult- scheduled for replacement of right breast implant, fascia fat grafting to right breast on 2/20//24  **Denies any fever, chills, CP/SOB or sick cotacts

## 2024-02-08 NOTE — H&P PST ADULT - NSICDXPASTMEDICALHX_GEN_ALL_CORE_FT
PAST MEDICAL HISTORY:  DCIS (ductal carcinoma in situ)     Primary malignant neoplasm of lower outer quadrant of right female breast

## 2024-02-08 NOTE — H&P PST ADULT - ATTENDING COMMENTS
Patient seen and examined at the bedside, all questions and concerns answered and patient is willing to proceed with upcoming procedure.

## 2024-02-09 LAB
CULTURE RESULTS: SIGNIFICANT CHANGE UP
SPECIMEN SOURCE: SIGNIFICANT CHANGE UP

## 2024-02-16 RX ORDER — SODIUM CHLORIDE 9 MG/ML
1000 INJECTION, SOLUTION INTRAVENOUS
Refills: 0 | Status: DISCONTINUED | OUTPATIENT
Start: 2024-02-20 | End: 2024-02-20

## 2024-02-16 NOTE — ASU PATIENT PROFILE, ADULT - FALL HARM RISK - UNIVERSAL INTERVENTIONS
Bed in lowest position, wheels locked, appropriate side rails in place/Call bell, personal items and telephone in reach/Instruct patient to call for assistance before getting out of bed or chair/Non-slip footwear when patient is out of bed/[place] to call system/Physically safe environment - no spills, clutter or unnecessary equipment/Purposeful Proactive Rounding/Room/bathroom lighting operational, light cord in reach

## 2024-02-20 ENCOUNTER — TRANSCRIPTION ENCOUNTER (OUTPATIENT)
Age: 54
End: 2024-02-20

## 2024-02-20 ENCOUNTER — OUTPATIENT (OUTPATIENT)
Dept: OUTPATIENT SERVICES | Facility: HOSPITAL | Age: 54
LOS: 1 days | End: 2024-02-20
Payer: COMMERCIAL

## 2024-02-20 VITALS
WEIGHT: 220.02 LBS | SYSTOLIC BLOOD PRESSURE: 140 MMHG | OXYGEN SATURATION: 100 % | TEMPERATURE: 98 F | HEIGHT: 68 IN | DIASTOLIC BLOOD PRESSURE: 85 MMHG | HEART RATE: 62 BPM | RESPIRATION RATE: 12 BRPM

## 2024-02-20 VITALS
SYSTOLIC BLOOD PRESSURE: 144 MMHG | DIASTOLIC BLOOD PRESSURE: 80 MMHG | OXYGEN SATURATION: 100 % | RESPIRATION RATE: 14 BRPM | HEART RATE: 70 BPM

## 2024-02-20 DIAGNOSIS — Z90.49 ACQUIRED ABSENCE OF OTHER SPECIFIED PARTS OF DIGESTIVE TRACT: Chronic | ICD-10-CM

## 2024-02-20 DIAGNOSIS — Z98.890 OTHER SPECIFIED POSTPROCEDURAL STATES: Chronic | ICD-10-CM

## 2024-02-20 DIAGNOSIS — C50.911 MALIGNANT NEOPLASM OF UNSPECIFIED SITE OF RIGHT FEMALE BREAST: ICD-10-CM

## 2024-02-20 DIAGNOSIS — Z01.818 ENCOUNTER FOR OTHER PREPROCEDURAL EXAMINATION: ICD-10-CM

## 2024-02-20 DIAGNOSIS — Z90.13 ACQUIRED ABSENCE OF BILATERAL BREASTS AND NIPPLES: Chronic | ICD-10-CM

## 2024-02-20 DIAGNOSIS — Z98.891 HISTORY OF UTERINE SCAR FROM PREVIOUS SURGERY: Chronic | ICD-10-CM

## 2024-02-20 LAB — HCG UR QL: NEGATIVE — SIGNIFICANT CHANGE UP

## 2024-02-20 PROCEDURE — 11406 EXC TR-EXT B9+MARG >4.0 CM: CPT

## 2024-02-20 PROCEDURE — 15777 ACELLULAR DERM MATRIX IMPLT: CPT | Mod: RT

## 2024-02-20 PROCEDURE — 86901 BLOOD TYPING SEROLOGIC RH(D): CPT

## 2024-02-20 PROCEDURE — 99261: CPT

## 2024-02-20 PROCEDURE — C1789: CPT

## 2024-02-20 PROCEDURE — 19370 REVJ PERI-IMPLT CAPSULE BRST: CPT | Mod: RT,XU

## 2024-02-20 PROCEDURE — 19342 INSJ/RPLCMT BRST IMPLT SEP D: CPT | Mod: RT

## 2024-02-20 PROCEDURE — 86850 RBC ANTIBODY SCREEN: CPT

## 2024-02-20 PROCEDURE — 88304 TISSUE EXAM BY PATHOLOGIST: CPT | Mod: 26

## 2024-02-20 PROCEDURE — 88304 TISSUE EXAM BY PATHOLOGIST: CPT

## 2024-02-20 PROCEDURE — 81025 URINE PREGNANCY TEST: CPT

## 2024-02-20 PROCEDURE — 88300 SURGICAL PATH GROSS: CPT | Mod: 26,59

## 2024-02-20 PROCEDURE — 15771 GRFG AUTOL FAT LIPO 50 CC/<: CPT

## 2024-02-20 PROCEDURE — 88300 SURGICAL PATH GROSS: CPT

## 2024-02-20 PROCEDURE — 15772 GRFG AUTOL FAT LIPO EA ADDL: CPT

## 2024-02-20 PROCEDURE — 86900 BLOOD TYPING SEROLOGIC ABO: CPT

## 2024-02-20 PROCEDURE — 36415 COLL VENOUS BLD VENIPUNCTURE: CPT

## 2024-02-20 DEVICE — CYTAL WOUND MATRIX 3-LAYER 10X15CM: Type: IMPLANTABLE DEVICE | Status: FUNCTIONAL

## 2024-02-20 DEVICE — IMPLANTABLE DEVICE: Type: IMPLANTABLE DEVICE | Status: FUNCTIONAL

## 2024-02-20 RX ORDER — CEFAZOLIN SODIUM 1 G
2000 VIAL (EA) INJECTION ONCE
Refills: 0 | Status: COMPLETED | OUTPATIENT
Start: 2024-02-20 | End: 2024-02-20

## 2024-02-20 RX ORDER — SODIUM CHLORIDE 9 MG/ML
1000 INJECTION, SOLUTION INTRAVENOUS
Refills: 0 | Status: DISCONTINUED | OUTPATIENT
Start: 2024-02-20 | End: 2024-02-22

## 2024-02-20 RX ORDER — OXYCODONE HYDROCHLORIDE 5 MG/1
1 TABLET ORAL
Qty: 0 | Refills: 0 | DISCHARGE
Start: 2024-02-20

## 2024-02-20 RX ORDER — OXYCODONE HYDROCHLORIDE 5 MG/1
5 TABLET ORAL ONCE
Refills: 0 | Status: DISCONTINUED | OUTPATIENT
Start: 2024-02-20 | End: 2024-02-22

## 2024-02-20 RX ORDER — HYDROMORPHONE HYDROCHLORIDE 2 MG/ML
0.5 INJECTION INTRAMUSCULAR; INTRAVENOUS; SUBCUTANEOUS
Refills: 0 | Status: DISCONTINUED | OUTPATIENT
Start: 2024-02-20 | End: 2024-02-22

## 2024-02-20 RX ORDER — ONDANSETRON 8 MG/1
4 TABLET, FILM COATED ORAL ONCE
Refills: 0 | Status: DISCONTINUED | OUTPATIENT
Start: 2024-02-20 | End: 2024-02-22

## 2024-02-20 RX ADMIN — SODIUM CHLORIDE 75 MILLILITER(S): 9 INJECTION, SOLUTION INTRAVENOUS at 10:42

## 2024-02-20 NOTE — ASU DISCHARGE PLAN (ADULT/PEDIATRIC) - NS MD DC FALL RISK RISK
For information on Fall & Injury Prevention, visit: https://www.[facility]/news/fall-prevention-protects-and-maintains-health-and-mobility OR  https://www.[facility]/news/fall-prevention-tips-to-avoid-injury OR  https://www.cdc.gov/steadi/patient.html

## 2024-02-20 NOTE — BRIEF OPERATIVE NOTE - OPERATION/FINDINGS
Revision of right breast reconstruction with fat grafting and removal/replacement of silicone implant of right breast, placement of acell sheet to right breast.

## 2024-02-20 NOTE — BRIEF OPERATIVE NOTE - NSICDXBRIEFPROCEDURE_GEN_ALL_CORE_FT
PROCEDURES:  Revision of reconstructed breast 20-Feb-2024 15:31:45  Huma Monte  Breast capsulectomy 20-Feb-2024 15:31:52  Huma Monte  Silicone breast implant 20-Feb-2024 15:32:03  Huma Monte  Application, acellular dermal matrix 20-Feb-2024 15:32:09  Huma Monte  Fat grafting 20-Feb-2024 15:32:13  Huma Monte

## 2024-02-20 NOTE — BRIEF OPERATIVE NOTE - NSICDXBRIEFPROCEDURE_GEN_ALL_CORE_FT
PROCEDURES:  Revision of reconstructed breast 20-Feb-2024 15:28:20  Huma Monte  Fat grafting 20-Feb-2024 15:28:27  Huma Monte  Silicone breast implant 20-Feb-2024 15:28:49  Huma Monte  Application, acellular dermal matrix 20-Feb-2024 15:28:57  Huma Monte  Breast capsulectomy 20-Feb-2024 15:29:57  Huma Monte

## 2024-02-23 LAB — SURGICAL PATHOLOGY STUDY: SIGNIFICANT CHANGE UP

## 2024-05-10 ENCOUNTER — EMERGENCY (EMERGENCY)
Facility: HOSPITAL | Age: 54
LOS: 1 days | Discharge: HOME | End: 2024-05-10
Attending: STUDENT IN AN ORGANIZED HEALTH CARE EDUCATION/TRAINING PROGRAM | Admitting: STUDENT IN AN ORGANIZED HEALTH CARE EDUCATION/TRAINING PROGRAM
Payer: COMMERCIAL

## 2024-05-10 VITALS
TEMPERATURE: 97 F | WEIGHT: 200.62 LBS | HEART RATE: 78 BPM | DIASTOLIC BLOOD PRESSURE: 76 MMHG | RESPIRATION RATE: 15 BRPM | OXYGEN SATURATION: 95 % | SYSTOLIC BLOOD PRESSURE: 113 MMHG | HEIGHT: 68 IN

## 2024-05-10 DIAGNOSIS — Z90.49 ACQUIRED ABSENCE OF OTHER SPECIFIED PARTS OF DIGESTIVE TRACT: Chronic | ICD-10-CM

## 2024-05-10 DIAGNOSIS — Z90.13 ACQUIRED ABSENCE OF BILATERAL BREASTS AND NIPPLES: Chronic | ICD-10-CM

## 2024-05-10 DIAGNOSIS — Z98.890 OTHER SPECIFIED POSTPROCEDURAL STATES: Chronic | ICD-10-CM

## 2024-05-10 DIAGNOSIS — Z98.891 HISTORY OF UTERINE SCAR FROM PREVIOUS SURGERY: Chronic | ICD-10-CM

## 2024-05-10 PROCEDURE — 99284 EMERGENCY DEPT VISIT MOD MDM: CPT

## 2024-05-10 RX ORDER — ANASTROZOLE 1 MG/1
0 TABLET ORAL
Refills: 0 | DISCHARGE

## 2024-05-10 RX ORDER — DIPHENHYDRAMINE HCL 50 MG
25 CAPSULE ORAL ONCE
Refills: 0 | Status: COMPLETED | OUTPATIENT
Start: 2024-05-10 | End: 2024-05-10

## 2024-05-10 RX ORDER — SODIUM CHLORIDE 9 MG/ML
1000 INJECTION INTRAMUSCULAR; INTRAVENOUS; SUBCUTANEOUS ONCE
Refills: 0 | Status: COMPLETED | OUTPATIENT
Start: 2024-05-10 | End: 2024-05-10

## 2024-05-10 RX ORDER — ANASTROZOLE 1 MG/1
1 TABLET ORAL
Refills: 0 | DISCHARGE

## 2024-05-10 RX ORDER — FAMOTIDINE 10 MG/ML
20 INJECTION INTRAVENOUS ONCE
Refills: 0 | Status: COMPLETED | OUTPATIENT
Start: 2024-05-10 | End: 2024-05-10

## 2024-05-10 RX ORDER — FAMOTIDINE 10 MG/ML
20 INJECTION INTRAVENOUS ONCE
Refills: 0 | Status: DISCONTINUED | OUTPATIENT
Start: 2024-05-10 | End: 2024-05-10

## 2024-05-10 RX ORDER — ABEMACICLIB 100 MG/1
1 TABLET ORAL
Refills: 0 | DISCHARGE

## 2024-05-10 NOTE — ED PROVIDER NOTE - CLINICAL SUMMARY MEDICAL DECISION MAKING FREE TEXT BOX
Please reference above for HPI, including history and exam.  Asessment/plan: rash, drug hypersensitivity reaction, DRESS  ddx: cbc, bmp, esr, fluids, solumedrol, benadryl, pepcid Please reference above for HPI, including history and exam.  Asessment/plan: rash, drug hypersensitivity reaction, DRESS  ddx: cbc, bmp, esr, fluids, solumedrol, benadryl, pepcid  mdm: Patient with a leukopenia, mild anemia, which is a known side effect of her current cancer medication Verzenio. Her hgb appears to be baseline. She did have an episode of nausea here in the ED after the benadryl, which has improved with fluids and Zofran. She denies any current chest pain, sob, abd pain, flank pain, dizziness. Plan for solumedrol 50mg once daily x5 days, and benadryl as needed at home for residual raash. She will follow up outpatient with her PCP and oncologist, and her Bactrim has already been completed. For DC home.

## 2024-05-10 NOTE — ED PROVIDER NOTE - NSFOLLOWUPINSTRUCTIONS_ED_ALL_ED_FT
Drug Rash  Close-up of red and inflamed skin around a bandage after an injection.   A drug rash occurs when a medicine causes a change in the color or texture of the skin. It can develop minutes, hours, or days after you take the medicine. The rash may appear on a small area of skin or all over your body.    What are the causes?  This condition may be caused by one of these three conditions:  An allergic reaction to the medicine.  An unwanted side effect of a certain medicine.  Extreme sensitivity to sunlight caused by the medicine.  What increases the risk?  If you take any of these medicines that make your skin sensitive to light and are exposed to sunlight, it can make you more likely to develop this condition:  Antibiotics, including tetracyclines and sulfa medicines.  Antifungals.  Antihistamines.  Diuretics.  Retinoids, such as isotretinoin.  Statins.  NSAIDs.  What are the signs or symptoms?  A person scratching their arm.  Symptoms of this condition include:  Redness.  Tiny bumps.  Peeling.  Itching.  Itchy welts (hives).  Swelling.  How is this diagnosed?  This condition may be diagnosed based on:  A physical exam.  Tests to find out which medicine caused the rash. These tests may include:  Skin tests.  Blood tests.  How is this treated?  This condition is treated with medicines, including:  Antihistamine. This may be given to relieve itching.  NSAIDs. These may be given to reduce swelling and to treat pain.  A steroid medicine. This may be given to reduce swelling.  The rash usually goes away when you stop taking the medicine that caused it.    Follow these instructions at home:  Take over-the-counter and prescription medicines only as told by your health care provider.  Tell all your health care providers about any medicine reactions that you have had in the past.  If your rash was caused by sensitivity to sunlight, and while your rash is healing:  Avoid being in the sun if possible, especially when it is strongest, usually between 10 a.m. and 4 p.m.  Cover your skin with pants, long sleeves, and a hat when you are exposed to sunlight.  If you have hives:  Take a cool shower or use a cool compress to relieve itchiness.  Take over-the-counter antihistamines, as recommended by your health care provider, until the hives are gone. Hives are not contagious.  Keep all follow-up visits. This is important.  Contact a health care provider if:  You have fever.  Your rash is not going away.  Your rash gets worse.  Your rash comes back.  You have high-pitched whistling sounds when you breathe, most often when you breathe out (wheezing) or coughing.  Get help right away if:  You start to have breathing problems.  You start to have shortness of breath.  Your face or throat starts to swell.  You have severe weakness with dizziness or fainting.  You have chest pain.  Your skin starts to blister and peel.  These symptoms may represent a serious problem that is an emergency. Do not wait to see if the symptoms will go away. Get medical help right away. Call your local emergency services (911 in the [place] Do not drive yourself to the hospital.    Summary  A drug rash occurs when a medicine causes a change in the color or texture of the skin. The rash may appear on a small area of skin or all over your body.  It can develop minutes, hours, or days after you take the medicine.  Your health care provider will do various tests to determine what medicine caused your rash.  The rash may be treated with medicine to relieve itching, swelling, and pain.

## 2024-05-10 NOTE — ED PROVIDER NOTE - OBJECTIVE STATEMENT
This is a 53F with hx of breast cancer, s/p b/l mastectomy with implants, presents with diffuse erythematous rash. Patient is on her 10th day of Bactrim for a small right breast cellulitis, prescribed by her plastic surgeon, coalescing, erythematous rash to her torso, back, extremities.  No mucous membrane involvement, no fever, cough, chills, chest pain, shortness of breath.  No new contacts, no new cosmetics.

## 2024-05-10 NOTE — ED PROVIDER NOTE - CARE PROVIDER_API CALL
Bakari Guerrero  Internal Medicine  [address] [place] [id number]  Phone: [phone]  Fax: [phone]  Follow Up Time:

## 2024-05-10 NOTE — ED PROVIDER NOTE - SKIN, MLM
Are moderate erythematous, coalescing rash to trunk and neck, back, extremities.  No mucous membrane involvement, no skin sloughing, no pustules, no bleeding or discharge.  No bullae

## 2024-05-10 NOTE — ED PROVIDER NOTE - PATIENT PORTAL LINK FT
You can access the FollowMyHealth Patient Portal offered by  by registering at the following website: [email or web address]. By joining [employer]’s FollowMyHealth portal, you will also be able to view your health information using other applications (apps) compatible with our system.

## 2024-05-11 PROBLEM — D05.10 INTRADUCTAL CARCINOMA IN SITU OF UNSPECIFIED BREAST: Chronic | Status: ACTIVE | Noted: 2024-02-08

## 2024-05-11 LAB
ANION GAP SERPL CALC-SCNC: 6 MMOL/L — SIGNIFICANT CHANGE UP (ref 5–17)
BASOPHILS # BLD AUTO: 0.02 K/UL — SIGNIFICANT CHANGE UP (ref 0–0.2)
BASOPHILS NFR BLD AUTO: 1.2 % — SIGNIFICANT CHANGE UP (ref 0–2)
BUN SERPL-MCNC: 13 MG/DL — SIGNIFICANT CHANGE UP (ref 7–23)
CALCIUM SERPL-MCNC: 9.2 MG/DL — SIGNIFICANT CHANGE UP (ref 8.5–10.1)
CHLORIDE SERPL-SCNC: 108 MMOL/L — SIGNIFICANT CHANGE UP (ref 96–108)
CO2 SERPL-SCNC: 23 MMOL/L — SIGNIFICANT CHANGE UP (ref 22–31)
CREAT SERPL-MCNC: 1.2 MG/DL — SIGNIFICANT CHANGE UP (ref 0.5–1.3)
EGFR: 54 ML/MIN/1.73M2 — LOW
EOSINOPHIL # BLD AUTO: 0.08 K/UL — SIGNIFICANT CHANGE UP (ref 0–0.5)
EOSINOPHIL NFR BLD AUTO: 4.8 % — SIGNIFICANT CHANGE UP (ref 0–6)
ERYTHROCYTE [SEDIMENTATION RATE] IN BLOOD: 53 MM/HR — HIGH (ref 0–20)
GLUCOSE SERPL-MCNC: 105 MG/DL — HIGH (ref 70–99)
HCT VFR BLD CALC: 29.1 % — LOW (ref 34.5–45)
HGB BLD-MCNC: 9.5 G/DL — LOW (ref 11.5–15.5)
IMM GRANULOCYTES NFR BLD AUTO: 0.6 % — SIGNIFICANT CHANGE UP (ref 0–0.9)
LYMPHOCYTES # BLD AUTO: 0.57 K/UL — LOW (ref 1–3.3)
LYMPHOCYTES # BLD AUTO: 34.5 % — SIGNIFICANT CHANGE UP (ref 13–44)
MCHC RBC-ENTMCNC: 32.6 GM/DL — SIGNIFICANT CHANGE UP (ref 32–36)
MCHC RBC-ENTMCNC: 34.1 PG — HIGH (ref 27–34)
MCV RBC AUTO: 104.3 FL — HIGH (ref 80–100)
MONOCYTES # BLD AUTO: 0.13 K/UL — SIGNIFICANT CHANGE UP (ref 0–0.9)
MONOCYTES NFR BLD AUTO: 7.9 % — SIGNIFICANT CHANGE UP (ref 2–14)
NEUTROPHILS # BLD AUTO: 0.84 K/UL — LOW (ref 1.8–7.4)
NEUTROPHILS NFR BLD AUTO: 51 % — SIGNIFICANT CHANGE UP (ref 43–77)
NRBC # BLD: 0 /100 WBCS — SIGNIFICANT CHANGE UP (ref 0–0)
PLATELET # BLD AUTO: 139 K/UL — LOW (ref 150–400)
POTASSIUM SERPL-MCNC: 4 MMOL/L — SIGNIFICANT CHANGE UP (ref 3.5–5.3)
POTASSIUM SERPL-SCNC: 4 MMOL/L — SIGNIFICANT CHANGE UP (ref 3.5–5.3)
RBC # BLD: 2.79 M/UL — LOW (ref 3.8–5.2)
RBC # FLD: 13.3 % — SIGNIFICANT CHANGE UP (ref 10.3–14.5)
SODIUM SERPL-SCNC: 137 MMOL/L — SIGNIFICANT CHANGE UP (ref 135–145)
WBC # BLD: 1.65 K/UL — LOW (ref 3.8–10.5)
WBC # FLD AUTO: 1.65 K/UL — LOW (ref 3.8–10.5)

## 2024-05-11 PROCEDURE — 85652 RBC SED RATE AUTOMATED: CPT

## 2024-05-11 PROCEDURE — 99284 EMERGENCY DEPT VISIT MOD MDM: CPT | Mod: 25

## 2024-05-11 PROCEDURE — 85025 COMPLETE CBC W/AUTO DIFF WBC: CPT

## 2024-05-11 PROCEDURE — 80048 BASIC METABOLIC PNL TOTAL CA: CPT

## 2024-05-11 PROCEDURE — 96375 TX/PRO/DX INJ NEW DRUG ADDON: CPT

## 2024-05-11 PROCEDURE — 96374 THER/PROPH/DIAG INJ IV PUSH: CPT

## 2024-05-11 PROCEDURE — 36415 COLL VENOUS BLD VENIPUNCTURE: CPT

## 2024-05-11 RX ORDER — ONDANSETRON 8 MG/1
4 TABLET, FILM COATED ORAL ONCE
Refills: 0 | Status: COMPLETED | OUTPATIENT
Start: 2024-05-11 | End: 2024-05-11

## 2024-05-11 RX ADMIN — SODIUM CHLORIDE 1000 MILLILITER(S): 9 INJECTION INTRAMUSCULAR; INTRAVENOUS; SUBCUTANEOUS at 00:39

## 2024-05-11 RX ADMIN — Medication 125 MILLIGRAM(S): at 00:39

## 2024-05-11 RX ADMIN — FAMOTIDINE 20 MILLIGRAM(S): 10 INJECTION INTRAVENOUS at 00:39

## 2024-05-11 RX ADMIN — ONDANSETRON 4 MILLIGRAM(S): 8 TABLET, FILM COATED ORAL at 00:54

## 2024-05-11 RX ADMIN — Medication 25 MILLIGRAM(S): at 00:40

## 2024-05-11 NOTE — ED ADULT NURSE NOTE - NSFALLHARMRISKINTERV_ED_ALL_ED

## 2024-05-11 NOTE — ED ADULT NURSE NOTE - OBJECTIVE STATEMENT
presents with diffuse erythematous rash. Patient is on her 10th day of Bactrim for a small right breast cellulitis, prescribed by her plastic surgeon, coalescing, erythematous rash to her torso, back, extremities.  No mucous membrane involvement, no fever, cough, chills, chest pain, shortness of breath.  No new contacts, no new cosmetics Admitting MD

## 2024-06-10 ENCOUNTER — APPOINTMENT (OUTPATIENT)
Dept: SURGERY | Facility: CLINIC | Age: 54
End: 2024-06-10

## 2024-09-17 NOTE — PHYSICAL EXAM
[Normocephalic] : normocephalic Adequate: hears normal conversation without difficulty [Atraumatic] : atraumatic [EOMI] : extra ocular movement intact [PERRL] : pupils equal, round and reactive to light [Sclera nonicteric] : sclera nonicteric [Supple] : supple [No Supraclavicular Adenopathy] : no supraclavicular adenopathy [No Cervical Adenopathy] : no cervical adenopathy [Clear to Auscultation Bilat] : clear to auscultation bilaterally [Normal Sinus Rhythm] : normal sinus rhythm [Normal S1, S2] : normal S1 and S2 [Examined in the supine and seated position] : examined in the supine and seated position [No dominant masses] : no dominant masses left breast [No Nipple Retraction] : no left nipple retraction [No Nipple Discharge] : no left nipple discharge [No Axillary Lymphadenopathy] : no left axillary lymphadenopathy [de-identified] : well developed white female in no acute distress [de-identified] : ecchymosis of inferior breast from bx

## 2024-10-21 ENCOUNTER — APPOINTMENT (OUTPATIENT)
Dept: SURGERY | Facility: CLINIC | Age: 54
End: 2024-10-21
Payer: COMMERCIAL

## 2024-10-21 DIAGNOSIS — Z98.890 OTHER SPECIFIED POSTPROCEDURAL STATES: ICD-10-CM

## 2024-10-21 DIAGNOSIS — Z90.13 ACQUIRED ABSENCE OF BILATERAL BREASTS AND NIPPLES: ICD-10-CM

## 2024-10-21 DIAGNOSIS — N63.10 UNSPECIFIED LUMP IN THE RIGHT BREAST, UNSPECIFIED QUADRANT: ICD-10-CM

## 2024-10-21 PROCEDURE — 99212 OFFICE O/P EST SF 10 MIN: CPT

## 2024-11-06 ENCOUNTER — NON-APPOINTMENT (OUTPATIENT)
Age: 54
End: 2024-11-06

## 2024-12-19 NOTE — REASON FOR VISIT
TRANSFER - OUT REPORT:    Verbal report given to Melanie RODRIGUES on Мария Lentz  being transferred to [facility] for routine progression of patient care       Report consisted of patient's Situation, Background, Assessment and   Recommendations(SBAR).     Information from the following report(s) ED SBAR was reviewed with the receiving nurse.    [place] Fall Assessment:    Presents to emergency department  because of falls (Syncope, seizure, or loss of consciousness): No  Age > 70: No  Altered Mental Status, Intoxication with alcohol or substance confusion (Disorientation, impaired judgment, poor safety awaremess, or inability to follow instructions): No  Impaired Mobility: Ambulates or transfers with assistive devices or assistance; Unable to ambulate or transer.: No  Nursing Judgement: Yes          Lines:   Peripheral IV 12/18/24 Left;Proximal Forearm (Active)       Peripheral IV 12/18/24 Right Forearm (Active)   Site Assessment Clean, dry & intact 12/18/24 1949   Line Status Blood return noted 12/18/24 1949   Phlebitis Assessment No symptoms 12/18/24 1949   Infiltration Assessment 0 12/18/24 1949   Alcohol Cap Used No 12/18/24 1949   Dressing Status New dressing applied 12/18/24 1949   Dressing Type Transparent 12/18/24 1949   Dressing Intervention New 12/18/24 1949       Peripheral IV 12/19/24 Left Hand (Active)   Site Assessment Clean, dry & intact 12/19/24 0029   Line Status Blood return noted 12/19/24 0029   Phlebitis Assessment No symptoms 12/19/24 0029   Infiltration Assessment 0 12/19/24 0029   Alcohol Cap Used No 12/19/24 0029   Dressing Status New dressing applied 12/19/24 0029   Dressing Type Transparent 12/19/24 0029   Dressing Intervention New 12/19/24 0029        Opportunity for questions and clarification was provided.      Patient transported with:  Monitor           Razia Newman RN  12/19/24 0118     [Post Op: _________] : a [unfilled] post op visit

## 2025-01-21 DIAGNOSIS — Z13.31 ENCOUNTER FOR SCREENING FOR DEPRESSION: ICD-10-CM

## 2025-01-21 DIAGNOSIS — Z11.3 ENCOUNTER FOR SCREENING FOR INFECTIONS WITH A PREDOMINANTLY SEXUAL MODE OF TRANSMISSION: ICD-10-CM

## 2025-01-21 DIAGNOSIS — Z01.411 ENCOUNTER FOR GYNECOLOGICAL EXAMINATION (GENERAL) (ROUTINE) WITH ABNORMAL FINDINGS: ICD-10-CM

## 2025-01-22 ENCOUNTER — LABORATORY RESULT (OUTPATIENT)
Age: 55
End: 2025-01-22

## 2025-01-22 ENCOUNTER — APPOINTMENT (OUTPATIENT)
Dept: OBGYN | Facility: CLINIC | Age: 55
End: 2025-01-22
Payer: COMMERCIAL

## 2025-01-22 VITALS
HEIGHT: 68 IN | SYSTOLIC BLOOD PRESSURE: 124 MMHG | DIASTOLIC BLOOD PRESSURE: 76 MMHG | BODY MASS INDEX: 33.34 KG/M2 | OXYGEN SATURATION: 98 % | RESPIRATION RATE: 14 BRPM | HEART RATE: 95 BPM | WEIGHT: 220 LBS

## 2025-01-22 DIAGNOSIS — Z12.4 ENCOUNTER FOR SCREENING FOR MALIGNANT NEOPLASM OF CERVIX: ICD-10-CM

## 2025-01-22 DIAGNOSIS — Z90.13 ACQUIRED ABSENCE OF BILATERAL BREASTS AND NIPPLES: ICD-10-CM

## 2025-01-22 DIAGNOSIS — Z12.39 ENCOUNTER FOR OTHER SCREENING FOR MALIGNANT NEOPLASM OF BREAST: ICD-10-CM

## 2025-01-22 DIAGNOSIS — C50.511 MALIGNANT NEOPLASM OF LOWER-OUTER QUADRANT OF RIGHT FEMALE BREAST: ICD-10-CM

## 2025-01-22 DIAGNOSIS — B36.9 SUPERFICIAL MYCOSIS, UNSPECIFIED: ICD-10-CM

## 2025-01-22 DIAGNOSIS — Z87.898 PERSONAL HISTORY OF OTHER SPECIFIED CONDITIONS: ICD-10-CM

## 2025-01-22 DIAGNOSIS — N89.8 OTHER SPECIFIED NONINFLAMMATORY DISORDERS OF VAGINA: ICD-10-CM

## 2025-01-22 DIAGNOSIS — Z13.71 ENCOUNTER FOR NONPROCREATIVE SCREENING FOR GENETIC DISEASE CARRIER STATUS: ICD-10-CM

## 2025-01-22 DIAGNOSIS — Z01.419 ENCOUNTER FOR GYNECOLOGICAL EXAMINATION (GENERAL) (ROUTINE) W/OUT ABNORMAL FINDINGS: ICD-10-CM

## 2025-01-22 DIAGNOSIS — Z85.3 PERSONAL HISTORY OF MALIGNANT NEOPLASM OF BREAST: ICD-10-CM

## 2025-01-22 DIAGNOSIS — Z98.890 OTHER SPECIFIED POSTPROCEDURAL STATES: ICD-10-CM

## 2025-01-22 DIAGNOSIS — Z12.11 ENCOUNTER FOR SCREENING FOR MALIGNANT NEOPLASM OF COLON: ICD-10-CM

## 2025-01-22 DIAGNOSIS — N95.2 POSTMENOPAUSAL ATROPHIC VAGINITIS: ICD-10-CM

## 2025-01-22 DIAGNOSIS — M25.50 PAIN IN UNSPECIFIED JOINT: ICD-10-CM

## 2025-01-22 PROCEDURE — 99396 PREV VISIT EST AGE 40-64: CPT

## 2025-01-22 PROCEDURE — 82270 OCCULT BLOOD FECES: CPT

## 2025-01-22 PROCEDURE — 99459 PELVIC EXAMINATION: CPT

## 2025-01-22 RX ORDER — KETOCONAZOLE 20 MG/G
2 CREAM TOPICAL
Qty: 1 | Refills: 2 | Status: ACTIVE | COMMUNITY
Start: 2025-01-22 | End: 1900-01-01

## 2025-01-22 RX ORDER — MELOXICAM 10 MG/1
CAPSULE ORAL
Refills: 0 | Status: ACTIVE | COMMUNITY

## 2025-01-22 RX ORDER — ANASTROZOLE TABLETS 1 MG/1
1 TABLET ORAL
Refills: 0 | Status: ACTIVE | COMMUNITY

## 2025-01-22 RX ORDER — ABEMACICLIB 150 MG/1
150 TABLET ORAL
Refills: 0 | Status: ACTIVE | COMMUNITY

## 2025-01-22 RX ORDER — LEUPROLIDE ACETATE 11.25 MG
KIT INTRAMUSCULAR
Refills: 0 | Status: ACTIVE | COMMUNITY

## 2025-01-22 RX ORDER — NYSTATIN 100000 [USP'U]/G
100000 POWDER TOPICAL DAILY
Qty: 1 | Refills: 1 | Status: ACTIVE | COMMUNITY
Start: 2025-01-22 | End: 1900-01-01

## 2025-01-24 LAB — HPV HIGH+LOW RISK DNA PNL CVX: NOT DETECTED

## 2025-01-27 LAB — CYTOLOGY CVX/VAG DOC THIN PREP: ABNORMAL

## 2025-02-13 DIAGNOSIS — E66.812 OBESITY, CLASS 2: ICD-10-CM

## 2025-02-13 RX ORDER — TIRZEPATIDE 2.5 MG/.5ML
2.5 INJECTION, SOLUTION SUBCUTANEOUS WEEKLY
Qty: 1 | Refills: 0 | Status: ACTIVE | COMMUNITY
Start: 2025-02-13 | End: 1900-01-01

## 2025-04-24 ENCOUNTER — OUTPATIENT (OUTPATIENT)
Dept: OUTPATIENT SERVICES | Facility: HOSPITAL | Age: 55
LOS: 1 days | End: 2025-04-24
Payer: COMMERCIAL

## 2025-04-24 VITALS
TEMPERATURE: 97 F | RESPIRATION RATE: 16 BRPM | OXYGEN SATURATION: 96 % | DIASTOLIC BLOOD PRESSURE: 88 MMHG | HEIGHT: 68 IN | WEIGHT: 229.94 LBS | SYSTOLIC BLOOD PRESSURE: 127 MMHG | HEART RATE: 64 BPM

## 2025-04-24 DIAGNOSIS — Z98.890 OTHER SPECIFIED POSTPROCEDURAL STATES: Chronic | ICD-10-CM

## 2025-04-24 DIAGNOSIS — C50.911 MALIGNANT NEOPLASM OF UNSPECIFIED SITE OF RIGHT FEMALE BREAST: ICD-10-CM

## 2025-04-24 DIAGNOSIS — Z98.891 HISTORY OF UTERINE SCAR FROM PREVIOUS SURGERY: Chronic | ICD-10-CM

## 2025-04-24 DIAGNOSIS — Z90.49 ACQUIRED ABSENCE OF OTHER SPECIFIED PARTS OF DIGESTIVE TRACT: Chronic | ICD-10-CM

## 2025-04-24 DIAGNOSIS — Z01.818 ENCOUNTER FOR OTHER PREPROCEDURAL EXAMINATION: ICD-10-CM

## 2025-04-24 DIAGNOSIS — Z90.13 ACQUIRED ABSENCE OF BILATERAL BREASTS AND NIPPLES: Chronic | ICD-10-CM

## 2025-04-24 LAB
ALBUMIN SERPL ELPH-MCNC: 3.1 G/DL — LOW (ref 3.3–5)
ALP SERPL-CCNC: 79 U/L — SIGNIFICANT CHANGE UP (ref 40–120)
ALT FLD-CCNC: 20 U/L — SIGNIFICANT CHANGE UP (ref 12–78)
ANION GAP SERPL CALC-SCNC: 6 MMOL/L — SIGNIFICANT CHANGE UP (ref 5–17)
APTT BLD: 27.3 SEC — SIGNIFICANT CHANGE UP (ref 26.1–36.8)
AST SERPL-CCNC: 16 U/L — SIGNIFICANT CHANGE UP (ref 15–37)
BILIRUB SERPL-MCNC: 0.4 MG/DL — SIGNIFICANT CHANGE UP (ref 0.2–1.2)
BUN SERPL-MCNC: 14 MG/DL — SIGNIFICANT CHANGE UP (ref 7–23)
CALCIUM SERPL-MCNC: 9.2 MG/DL — SIGNIFICANT CHANGE UP (ref 8.5–10.1)
CHLORIDE SERPL-SCNC: 109 MMOL/L — HIGH (ref 96–108)
CO2 SERPL-SCNC: 27 MMOL/L — SIGNIFICANT CHANGE UP (ref 22–31)
CREAT SERPL-MCNC: 1 MG/DL — SIGNIFICANT CHANGE UP (ref 0.5–1.3)
EGFR: 67 ML/MIN/1.73M2 — SIGNIFICANT CHANGE UP
EGFR: 67 ML/MIN/1.73M2 — SIGNIFICANT CHANGE UP
GLUCOSE SERPL-MCNC: 102 MG/DL — HIGH (ref 70–99)
HCT VFR BLD CALC: 29.7 % — LOW (ref 34.5–45)
HGB BLD-MCNC: 10.1 G/DL — LOW (ref 11.5–15.5)
INR BLD: 0.83 RATIO — LOW (ref 0.85–1.16)
MCHC RBC-ENTMCNC: 34 G/DL — SIGNIFICANT CHANGE UP (ref 32–36)
MCHC RBC-ENTMCNC: 35.1 PG — HIGH (ref 27–34)
MCV RBC AUTO: 103.1 FL — HIGH (ref 80–100)
NRBC BLD AUTO-RTO: 0 /100 WBCS — SIGNIFICANT CHANGE UP (ref 0–0)
PLATELET # BLD AUTO: 148 K/UL — LOW (ref 150–400)
POTASSIUM SERPL-MCNC: 4 MMOL/L — SIGNIFICANT CHANGE UP (ref 3.5–5.3)
POTASSIUM SERPL-SCNC: 4 MMOL/L — SIGNIFICANT CHANGE UP (ref 3.5–5.3)
PROT SERPL-MCNC: 6.9 G/DL — SIGNIFICANT CHANGE UP (ref 6–8.3)
PROTHROM AB SERPL-ACNC: 9.8 SEC — LOW (ref 9.9–13.4)
RBC # BLD: 2.88 M/UL — LOW (ref 3.8–5.2)
RBC # FLD: 13.5 % — SIGNIFICANT CHANGE UP (ref 10.3–14.5)
SODIUM SERPL-SCNC: 142 MMOL/L — SIGNIFICANT CHANGE UP (ref 135–145)
WBC # BLD: 2.52 K/UL — LOW (ref 3.8–10.5)
WBC # FLD AUTO: 2.52 K/UL — LOW (ref 3.8–10.5)

## 2025-04-24 PROCEDURE — 80053 COMPREHEN METABOLIC PANEL: CPT

## 2025-04-24 PROCEDURE — 85610 PROTHROMBIN TIME: CPT

## 2025-04-24 PROCEDURE — 93005 ELECTROCARDIOGRAM TRACING: CPT

## 2025-04-24 PROCEDURE — 93010 ELECTROCARDIOGRAM REPORT: CPT

## 2025-04-24 PROCEDURE — 36415 COLL VENOUS BLD VENIPUNCTURE: CPT

## 2025-04-24 PROCEDURE — 85027 COMPLETE CBC AUTOMATED: CPT

## 2025-04-24 PROCEDURE — 85730 THROMBOPLASTIN TIME PARTIAL: CPT

## 2025-04-24 PROCEDURE — G0463: CPT

## 2025-04-24 NOTE — H&P PST ADULT - HISTORY OF PRESENT ILLNESS
55 yo female Breast Cancer scheduled for Revision Right Breast with Implant Replacement Poss Mesh Replacement on May1, 2025 with Dr Baeza.  Patient she was diagnosed with Breast Cancer 12/23, lumpectomy addititional right breast surgery then bilateral mastectomy.  Patient has had implants which are asymmetrical.  Patient right breast has been uncomfortable.   55 yo female hx  Breast Cancer scheduled for Revision Right Breast with Implant Replacement Poss Mesh Replacement on May1, 2025 with Dr Baeza.  Patient she was diagnosed with Breast Cancer 12/23, lumpectomy addititional right breast surgery then bilateral mastectomy.  Patient has had implants which are asymmetrical.  Patient's  right breast has been uncomfortable.   55 yo female hx  Breast Cancer scheduled for Revision Right Breast with Implant Replacement Poss Mesh Replacement on May1, 2025 with Dr Baeza.  Patient she was diagnosed with Breast Cancer 12/23, lumpectomy addititional right breast surgery then bilateral mastectomy.  Patient has had implants which are asymmetrical.  Patient's  right breast has been uncomfortable.    LMP over 2 years ago

## 2025-04-24 NOTE — H&P PST ADULT - NSICDXPASTMEDICALHX_GEN_ALL_CORE_FT
PAST MEDICAL HISTORY:  DCIS (ductal carcinoma in situ)     Primary malignant neoplasm of lower outer quadrant of right female breast      PAST MEDICAL HISTORY:  DCIS (ductal carcinoma in situ)     Malignant neoplasm of right female breast     Primary malignant neoplasm of lower outer quadrant of right female breast

## 2025-04-24 NOTE — H&P PST ADULT - ASSESSMENT
53 yo female hx  Breast Cancer scheduled for Revision Right Breast with Implant Replacement Poss Mesh Replacement on May1, 2025 with Dr Baeza.

## 2025-04-24 NOTE — H&P PST ADULT - NSICDXFAMILYHX_GEN_ALL_CORE_FT
FAMILY HISTORY:  Father  Still living? No  FH: cirrhosis, Age at diagnosis: Age Unknown    Mother  Still living? Yes, Estimated age: 71-80  FH: stroke, Age at diagnosis: Age Unknown    Sibling  Still living? Yes, Estimated age: 48  Family history of sinus tachycardia, Age at diagnosis: Age Unknown

## 2025-04-24 NOTE — H&P PST ADULT - BP NONINVASIVE MEAN (MM HG)
Suszanne Habermann was seen and treated in our emergency department on 3/28/2023. He may return to work on 03/29/2023. If you have any questions or concerns, please don't hesitate to call.       Arvind Rea MD 101

## 2025-05-05 NOTE — ASU PATIENT PROFILE, ADULT - FALL HARM RISK - UNIVERSAL INTERVENTIONS
risk reduction/prevention/functional limitations in following categories/impairments found
Bed in lowest position, wheels locked, appropriate side rails in place/Call bell, personal items and telephone in reach/Instruct patient to call for assistance before getting out of bed or chair/Non-slip footwear when patient is out of bed/[place] to call system/Physically safe environment - no spills, clutter or unnecessary equipment/Purposeful Proactive Rounding/Room/bathroom lighting operational, light cord in reach

## 2025-05-05 NOTE — ASU PATIENT PROFILE, ADULT - NSICDXPASTMEDICALHX_GEN_ALL_CORE_FT
PAST MEDICAL HISTORY:  DCIS (ductal carcinoma in situ)     Malignant neoplasm of right female breast     Primary malignant neoplasm of lower outer quadrant of right female breast

## 2025-05-06 ENCOUNTER — TRANSCRIPTION ENCOUNTER (OUTPATIENT)
Age: 55
End: 2025-05-06

## 2025-05-06 ENCOUNTER — OUTPATIENT (OUTPATIENT)
Dept: OUTPATIENT SERVICES | Facility: HOSPITAL | Age: 55
LOS: 1 days | End: 2025-05-06
Payer: COMMERCIAL

## 2025-05-06 VITALS
WEIGHT: 229.94 LBS | HEIGHT: 68 IN | DIASTOLIC BLOOD PRESSURE: 82 MMHG | OXYGEN SATURATION: 96 % | RESPIRATION RATE: 12 BRPM | HEART RATE: 62 BPM | SYSTOLIC BLOOD PRESSURE: 121 MMHG | TEMPERATURE: 97 F

## 2025-05-06 VITALS
DIASTOLIC BLOOD PRESSURE: 68 MMHG | RESPIRATION RATE: 12 BRPM | SYSTOLIC BLOOD PRESSURE: 112 MMHG | OXYGEN SATURATION: 97 % | HEART RATE: 66 BPM

## 2025-05-06 DIAGNOSIS — Z98.891 HISTORY OF UTERINE SCAR FROM PREVIOUS SURGERY: Chronic | ICD-10-CM

## 2025-05-06 DIAGNOSIS — Z98.890 OTHER SPECIFIED POSTPROCEDURAL STATES: Chronic | ICD-10-CM

## 2025-05-06 DIAGNOSIS — Z90.49 ACQUIRED ABSENCE OF OTHER SPECIFIED PARTS OF DIGESTIVE TRACT: Chronic | ICD-10-CM

## 2025-05-06 DIAGNOSIS — C50.911 MALIGNANT NEOPLASM OF UNSPECIFIED SITE OF RIGHT FEMALE BREAST: ICD-10-CM

## 2025-05-06 DIAGNOSIS — Z90.13 ACQUIRED ABSENCE OF BILATERAL BREASTS AND NIPPLES: Chronic | ICD-10-CM

## 2025-05-06 PROCEDURE — 88304 TISSUE EXAM BY PATHOLOGIST: CPT

## 2025-05-06 PROCEDURE — 88300 SURGICAL PATH GROSS: CPT | Mod: 26,59

## 2025-05-06 PROCEDURE — C1789: CPT

## 2025-05-06 PROCEDURE — 88304 TISSUE EXAM BY PATHOLOGIST: CPT | Mod: 26

## 2025-05-06 PROCEDURE — 19342 INSJ/RPLCMT BRST IMPLT SEP D: CPT | Mod: RT

## 2025-05-06 PROCEDURE — 19380 REVJ RECONSTRUCTED BREAST: CPT | Mod: RT,XS

## 2025-05-06 PROCEDURE — C9399: CPT

## 2025-05-06 PROCEDURE — C1781: CPT

## 2025-05-06 PROCEDURE — 15771 GRFG AUTOL FAT LIPO 50 CC/<: CPT

## 2025-05-06 PROCEDURE — 88300 SURGICAL PATH GROSS: CPT

## 2025-05-06 DEVICE — IMPLANTABLE DEVICE: Type: IMPLANTABLE DEVICE | Site: RIGHT | Status: FUNCTIONAL

## 2025-05-06 RX ORDER — HYDROMORPHONE/SOD CHLOR,ISO/PF 2 MG/10 ML
1 SYRINGE (ML) INJECTION
Refills: 0 | Status: DISCONTINUED | OUTPATIENT
Start: 2025-05-06 | End: 2025-05-06

## 2025-05-06 RX ORDER — OXYCODONE HYDROCHLORIDE 30 MG/1
1 TABLET ORAL
Qty: 15 | Refills: 0
Start: 2025-05-06

## 2025-05-06 RX ORDER — HYDROMORPHONE/SOD CHLOR,ISO/PF 2 MG/10 ML
0.5 SYRINGE (ML) INJECTION
Refills: 0 | Status: DISCONTINUED | OUTPATIENT
Start: 2025-05-06 | End: 2025-05-06

## 2025-05-06 RX ORDER — DOCUSATE SODIUM 100 MG
1 CAPSULE ORAL
Qty: 20 | Refills: 0
Start: 2025-05-06

## 2025-05-06 RX ORDER — ONDANSETRON HCL/PF 4 MG/2 ML
4 VIAL (ML) INJECTION ONCE
Refills: 0 | Status: COMPLETED | OUTPATIENT
Start: 2025-05-06 | End: 2025-05-06

## 2025-05-06 RX ORDER — CEPHALEXIN 250 MG/1
1 CAPSULE ORAL
Qty: 40 | Refills: 0
Start: 2025-05-06 | End: 2025-05-15

## 2025-05-06 RX ORDER — SODIUM CHLORIDE 9 G/1000ML
1000 INJECTION, SOLUTION INTRAVENOUS
Refills: 0 | Status: DISCONTINUED | OUTPATIENT
Start: 2025-05-06 | End: 2025-05-06

## 2025-05-06 RX ORDER — CEFAZOLIN SODIUM IN 0.9 % NACL 3 G/100 ML
2000 INTRAVENOUS SOLUTION, PIGGYBACK (ML) INTRAVENOUS ONCE
Refills: 0 | Status: COMPLETED | OUTPATIENT
Start: 2025-05-06 | End: 2025-05-06

## 2025-05-06 RX ADMIN — Medication 4 MILLIGRAM(S): at 15:47

## 2025-05-06 RX ADMIN — SODIUM CHLORIDE 60 MILLILITER(S): 9 INJECTION, SOLUTION INTRAVENOUS at 09:21

## 2025-05-06 RX ADMIN — SODIUM CHLORIDE 75 MILLILITER(S): 9 INJECTION, SOLUTION INTRAVENOUS at 13:43

## 2025-05-06 NOTE — ASU DISCHARGE PLAN (ADULT/PEDIATRIC) - CARE PROVIDER_API CALL
Dawit Baeza  Plastic Surgery  [address] [place] [address], [address]  [place] [address]  Phone: [phone]  Fax: [phone]  Established Patient  Scheduled Appointment: 05/15/2025

## 2025-05-06 NOTE — BRIEF OPERATIVE NOTE - NSICDXBRIEFPOSTOP_GEN_ALL_CORE_FT
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

## 2025-05-06 NOTE — BRIEF OPERATIVE NOTE - OPERATION/FINDINGS
Previous R breast implant migrated laterally towards axilla and rotated within capsule. Implant removed, R lateral breast capsule scored with electrocautery. Lateral capsule imbricated and mesh sutured to anchor new implant in place. Fat grafting sourced from R axilla and anterior abdomen.

## 2025-05-06 NOTE — BRIEF OPERATIVE NOTE - NSICDXBRIEFPREOP_GEN_ALL_CORE_FT
PRE-OP DIAGNOSIS:  Cancer of right breast 06-May-2025 13:29:35  Jonah Larson  Other mechanical complication of breast prosthesis and implant 06-May-2025 13:30:33  Jonah Larson

## 2025-05-06 NOTE — BRIEF OPERATIVE NOTE - COMMENTS
IJonah PA-C provided direct first assist support to the surgeon during this surgical procedure. My involvement included positioning, prepping and draping the patient prior to surgery, ensuring clear visibility and exposure for the surgeon by using instruments such as retractors, suction and sponges, retrieving specimens from the operative field, closing surgical incisions, and dressing wounds.  As well as other tasks as directed by the surgeon.

## 2025-05-06 NOTE — BRIEF OPERATIVE NOTE - NSICDXBRIEFPROCEDURE_GEN_ALL_CORE_FT
PROCEDURES:  Revision of reconstruction of right breast using fat graft 06-May-2025 13:28:16  Jonah Larson  Insertion, breast implant, right 06-May-2025 13:29:01  Jonah Larson

## 2025-05-06 NOTE — ASU DISCHARGE PLAN (ADULT/PEDIATRIC) - FINANCIAL ASSISTANCE
[facility] provides services at a reduced cost to those who are determined to be eligible through [facility]’s financial assistance program. Information regarding [facility]’s financial assistance program can be found by going to [email or web address] or by calling [phone].

## 2025-05-06 NOTE — ASU DISCHARGE PLAN (ADULT/PEDIATRIC) - ASU DC SPECIAL INSTRUCTIONSFT
Do not remove dressings.  Keep dressings clean and dry.  Keep abdominal binder in place.      No heavy lifting (nothing heavier than 5 lbs.), no strenuous physical activity, no exercise.      Do not drive.       You may perform your usual daily tasks as tolerated.  You are encouraged to walk as much as tolerated (to prevent blood clots in the legs, and to keep your bowels moving/active).      Continue to use the incentive spirometer at home as instructed.        For post-operative pain, take the followin. Tylenol Extra-Strength over the counter 500mg pills - take 2 every six hours regularly for the first 4-5 days after surgery, then as needed for pain.     2. Take Oxycodone as prescribed if you still have pain despite the Tylenol and Motrin combination.     Take colace while taking narcotic pain medication to prevent opioid-induced constipation.     After 24 hours, it is okay to take Ibuprofen if you are not achieving pain control with the Tylenol and Oxycodone. Ibuprofen over-the-counter 200mg pills - take 2 to 3 pills every 6 hours as needed for pain after surgery (take with food).    Please take antibiotics as prescribed until your follow up appointment on 05/15    Follow-up with Dr. Baeza in her office next week (Thursday, 05/15) - call office for appointment if not already made.

## 2025-05-06 NOTE — ASU DISCHARGE PLAN (ADULT/PEDIATRIC) - NS MD DC FALL RISK RISK
For information on Fall & Injury Prevention, visit: [email or web address] OR  [email or web address] OR  https://www.cdc.gov/steadi/patient.html

## 2025-05-08 LAB — SURGICAL PATHOLOGY STUDY: SIGNIFICANT CHANGE UP

## 2025-06-20 ENCOUNTER — OUTPATIENT (OUTPATIENT)
Dept: OUTPATIENT SERVICES | Facility: HOSPITAL | Age: 55
LOS: 1 days | End: 2025-06-20
Payer: COMMERCIAL

## 2025-06-20 DIAGNOSIS — Z90.49 ACQUIRED ABSENCE OF OTHER SPECIFIED PARTS OF DIGESTIVE TRACT: Chronic | ICD-10-CM

## 2025-06-20 DIAGNOSIS — Z98.890 OTHER SPECIFIED POSTPROCEDURAL STATES: Chronic | ICD-10-CM

## 2025-06-20 DIAGNOSIS — Z90.13 ACQUIRED ABSENCE OF BILATERAL BREASTS AND NIPPLES: Chronic | ICD-10-CM

## 2025-06-20 DIAGNOSIS — Z98.891 HISTORY OF UTERINE SCAR FROM PREVIOUS SURGERY: Chronic | ICD-10-CM

## 2025-06-20 PROBLEM — C50.911 MALIGNANT NEOPLASM OF UNSPECIFIED SITE OF RIGHT FEMALE BREAST: Chronic | Status: ACTIVE | Noted: 2025-04-24

## 2025-06-20 PROCEDURE — 85025 COMPLETE CBC W/AUTO DIFF WBC: CPT

## 2025-06-20 PROCEDURE — 88304 TISSUE EXAM BY PATHOLOGIST: CPT

## 2025-06-20 PROCEDURE — 19342 INSJ/RPLCMT BRST IMPLT SEP D: CPT | Mod: RT

## 2025-06-20 PROCEDURE — 86850 RBC ANTIBODY SCREEN: CPT

## 2025-06-20 PROCEDURE — 36415 COLL VENOUS BLD VENIPUNCTURE: CPT

## 2025-06-20 PROCEDURE — 86901 BLOOD TYPING SEROLOGIC RH(D): CPT

## 2025-06-20 PROCEDURE — 88300 SURGICAL PATH GROSS: CPT | Mod: 26,59

## 2025-06-20 PROCEDURE — 15777 ACELLULAR DERM MATRIX IMPLT: CPT

## 2025-06-20 PROCEDURE — 86900 BLOOD TYPING SEROLOGIC ABO: CPT

## 2025-06-20 PROCEDURE — 19380 REVJ RECONSTRUCTED BREAST: CPT | Mod: RT

## 2025-06-20 PROCEDURE — 88300 SURGICAL PATH GROSS: CPT

## 2025-06-20 PROCEDURE — 93005 ELECTROCARDIOGRAM TRACING: CPT

## 2025-06-20 PROCEDURE — 85730 THROMBOPLASTIN TIME PARTIAL: CPT

## 2025-06-20 PROCEDURE — 80053 COMPREHEN METABOLIC PANEL: CPT

## 2025-06-20 PROCEDURE — C1789: CPT

## 2025-06-20 PROCEDURE — 88304 TISSUE EXAM BY PATHOLOGIST: CPT | Mod: 26

## 2025-06-20 PROCEDURE — 85610 PROTHROMBIN TIME: CPT

## 2025-06-20 PROCEDURE — C1763: CPT

## 2025-06-24 DIAGNOSIS — S21.009A UNSPECIFIED OPEN WOUND OF UNSPECIFIED BREAST, INITIAL ENCOUNTER: ICD-10-CM

## 2025-07-11 ENCOUNTER — TRANSCRIPTION ENCOUNTER (OUTPATIENT)
Age: 55
End: 2025-07-11

## 2025-07-11 ENCOUNTER — OUTPATIENT (OUTPATIENT)
Dept: OUTPATIENT SERVICES | Facility: HOSPITAL | Age: 55
LOS: 1 days | End: 2025-07-11
Payer: COMMERCIAL

## 2025-07-11 DIAGNOSIS — Z90.13 ACQUIRED ABSENCE OF BILATERAL BREASTS AND NIPPLES: Chronic | ICD-10-CM

## 2025-07-11 DIAGNOSIS — Z98.890 OTHER SPECIFIED POSTPROCEDURAL STATES: Chronic | ICD-10-CM

## 2025-07-11 DIAGNOSIS — Z98.891 HISTORY OF UTERINE SCAR FROM PREVIOUS SURGERY: Chronic | ICD-10-CM

## 2025-07-11 DIAGNOSIS — Z90.49 ACQUIRED ABSENCE OF OTHER SPECIFIED PARTS OF DIGESTIVE TRACT: Chronic | ICD-10-CM

## 2025-07-11 PROCEDURE — 85730 THROMBOPLASTIN TIME PARTIAL: CPT

## 2025-07-11 PROCEDURE — 84702 CHORIONIC GONADOTROPIN TEST: CPT

## 2025-07-11 PROCEDURE — 88304 TISSUE EXAM BY PATHOLOGIST: CPT

## 2025-07-11 PROCEDURE — 88300 SURGICAL PATH GROSS: CPT | Mod: 26,59

## 2025-07-11 PROCEDURE — 86850 RBC ANTIBODY SCREEN: CPT

## 2025-07-11 PROCEDURE — 19342 INSJ/RPLCMT BRST IMPLT SEP D: CPT | Mod: RT

## 2025-07-11 PROCEDURE — 86901 BLOOD TYPING SEROLOGIC RH(D): CPT

## 2025-07-11 PROCEDURE — C1789: CPT

## 2025-07-11 PROCEDURE — 93005 ELECTROCARDIOGRAM TRACING: CPT

## 2025-07-11 PROCEDURE — 86900 BLOOD TYPING SEROLOGIC ABO: CPT

## 2025-07-11 PROCEDURE — C1889: CPT

## 2025-07-11 PROCEDURE — 88304 TISSUE EXAM BY PATHOLOGIST: CPT | Mod: 26

## 2025-07-11 PROCEDURE — 85025 COMPLETE CBC W/AUTO DIFF WBC: CPT

## 2025-07-11 PROCEDURE — 85610 PROTHROMBIN TIME: CPT

## 2025-07-11 PROCEDURE — 80053 COMPREHEN METABOLIC PANEL: CPT

## 2025-07-11 PROCEDURE — 88300 SURGICAL PATH GROSS: CPT

## 2025-07-11 PROCEDURE — 36415 COLL VENOUS BLD VENIPUNCTURE: CPT

## 2025-07-11 DEVICE — IMPLANTABLE DEVICE: Type: IMPLANTABLE DEVICE | Site: RIGHT | Status: FUNCTIONAL

## 2025-07-11 DEVICE — ARISTA 3GR: Type: IMPLANTABLE DEVICE | Site: RIGHT | Status: FUNCTIONAL

## 2025-07-15 DIAGNOSIS — T14.8XXA OTHER INJURY OF UNSPECIFIED BODY REGION, INITIAL ENCOUNTER: ICD-10-CM

## (undated) DEVICE — SOL IRR POUR H2O 1000ML

## (undated) DEVICE — PLV/PSP-ESU FORCE2 FIL 16736T: Type: DURABLE MEDICAL EQUIPMENT

## (undated) DEVICE — DRSG CURITY GAUZE SPONGE 4 X 4" 12-PLY

## (undated) DEVICE — DRSG COMBINE 5 X 9"

## (undated) DEVICE — ONETRAC LIGHTED RETRACTOR 40 X 20MM DISP

## (undated) DEVICE — SUT MAXON 4-0 30" P-12

## (undated) DEVICE — VENODYNE/SCD SLEEVE CALF MEDIUM

## (undated) DEVICE — ELCTR BOVIE TIP BLADE INSULATED 2.75" EDGE

## (undated) DEVICE — PLV-SCD MACHINE: Type: DURABLE MEDICAL EQUIPMENT

## (undated) DEVICE — Device

## (undated) DEVICE — GOWN TRIMAX LG

## (undated) DEVICE — GOWN XL W TOWEL

## (undated) DEVICE — ABDOMINAL BINDER XXL 12" X 72"-84"

## (undated) DEVICE — PROTECTOR CORNEAL BLUE ADULT

## (undated) DEVICE — WARMING BLANKET LOWER ADULT

## (undated) DEVICE — ELCTR BOVIE PENCIL SMOKE EVACUATION

## (undated) DEVICE — TUBING MICROAIRE ASPIRATION SET 12FT

## (undated) DEVICE — VENODYNE/SCD SLEEVE CALF LARGE

## (undated) DEVICE — SUT MONOSOF 3-0 18" P-12

## (undated) DEVICE — SUT BIOSYN 4-0 27" P-12

## (undated) DEVICE — PACK MINOR WITH LAP

## (undated) DEVICE — DRAPE TOWEL BLUE 17" X 24"

## (undated) DEVICE — CANISTER LIPOFILTER W/O SYRINGE 3000ML

## (undated) DEVICE — BLADE SCALPEL SAFETYLOCK #11

## (undated) DEVICE — DURABLE MEDICAL EQUIPMENT: Type: DURABLE MEDICAL EQUIPMENT

## (undated) DEVICE — DRSG TOPIFOAM

## (undated) DEVICE — DRSG MAMMARY SUPPORT LG SIZE 4

## (undated) DEVICE — SOL IRR POUR NS 0.9% 1000ML

## (undated) DEVICE — SUT SOFSILK 6-0 18" P-10

## (undated) DEVICE — DRAPE INSTRUMENT POUCH 6.75" X 11"

## (undated) DEVICE — DRSG MAMMARY SUPPORT MED SIZE 3

## (undated) DEVICE — SUT MONOSOF 5-0 18" P-13

## (undated) DEVICE — DRAIN RESERVOIR FOR JACKSON PRATT 100CC CARDINAL

## (undated) DEVICE — ELCTR BOVIE PENCIL HANDPIECE

## (undated) DEVICE — ABDOMINAL BINDER MED/LG 12" X 36"-54"

## (undated) DEVICE — DRSG COMBINE 5X9"

## (undated) DEVICE — TUBING SUCTION 20FT

## (undated) DEVICE — SYR LUER LOK 5CC

## (undated) DEVICE — GLV 8 PROTEXIS (WHITE)

## (undated) DEVICE — WARMING BLANKET FULL UNDERBODY

## (undated) DEVICE — SUT MONOSOF 4-0 18" P-12

## (undated) DEVICE — STAPLER SKIN VISI-STAT 35 WIDE

## (undated) DEVICE — DRAPE 3/4 SHEET W REINFORCEMENT 56X77"

## (undated) DEVICE — PREP BETADINE KIT

## (undated) DEVICE — DRSG DERMABOND PRINEO 22CM

## (undated) DEVICE — SYR LUER LOK 10CC

## (undated) DEVICE — TUBING INFILTRATION

## (undated) DEVICE — DRSG TEGADERM 2.5X3"

## (undated) DEVICE — LAP PAD W RING 18 X 18"

## (undated) DEVICE — BLADE SCALPEL SAFETYLOCK #10

## (undated) DEVICE — CATH IV SAFE BC 20G X 1.16" (PINK)

## (undated) DEVICE — DRSG DERMABOND 0.7ML

## (undated) DEVICE — SPONGE PEANUT AUTO COUNT

## (undated) DEVICE — NDL HYPO SAFE 25G X 1.5" (ORANGE)

## (undated) DEVICE — LINER QUICK FIT SUCTION 3000CC

## (undated) DEVICE — STAPLER SKIN PROXIMATE

## (undated) DEVICE — SUCTION YANKAUER OPEN TIP NO VENT CURVE

## (undated) DEVICE — CATH IV SAFE INSYTE 16G X 1.16" (GRAY)

## (undated) DEVICE — SOL BETADINE PRO IOD 4OZ

## (undated) DEVICE — DRAPE NAVIGATOR COVER

## (undated) DEVICE — GLV 6.5 PROTEXIS (WHITE)

## (undated) DEVICE — ONETRAC LIGHTED RETRACTOR 135 X 30MM DISP

## (undated) DEVICE — CANISTER MICROAIRE LIPOFILTER 3000ML

## (undated) DEVICE — SUT PROLENE 6-0 24" BV-1

## (undated) DEVICE — DRAPE CHEST BREAST 106" X 122"

## (undated) DEVICE — DRSG SURGICAL BRA XL 40-42

## (undated) DEVICE — ONETRAC LIGHTED RETRACTOR 90 X 22MM DISP

## (undated) DEVICE — PACK MAJOR ABDOMINAL WITH LAP

## (undated) DEVICE — ELCTR BOVIE TIP BLADE VALLEYLAB 6.5"

## (undated) DEVICE — DRAIN JACKSON PRATT 10MM FLAT FULL NO TROCAR

## (undated) DEVICE — SUT POLYSORB 3-0 30" V-20 UNDYED

## (undated) DEVICE — SUT POLYSORB 3-0 18" V-20 UNDYED

## (undated) DEVICE — SUT MONOCRYL 4-0 27" PS-2 UNDYED

## (undated) DEVICE — PLV/PSP-ESU FORCEFX F8J7721A: Type: DURABLE MEDICAL EQUIPMENT

## (undated) DEVICE — SUT SOFSILK 0 30" C-16

## (undated) DEVICE — DRAIN JACKSON PRATT 7MM FLAT FULL NO TROCAR

## (undated) DEVICE — DRAPE 3/4 SHEET 52X76"

## (undated) DEVICE — CANNULA MICROAIRE MERCEDES 3MM 22CM

## (undated) DEVICE — SUT SURGIPRO 5-0 18" P-12

## (undated) DEVICE — TUBING SINGLE SPIKE INFILTRTION 15.5 FT

## (undated) DEVICE — ELCTR GROUNDING PAD ADULT COVIDIEN

## (undated) DEVICE — GOWN XL

## (undated) DEVICE — WARMING BLANKET UPPER ADULT

## (undated) DEVICE — DRSG XEROFORM 5 X 9"

## (undated) DEVICE — ELCTR BOVIE TIP NEEDLE INSULATED 6.5" EDGE

## (undated) DEVICE — DRSG KLING 6"

## (undated) DEVICE — PROTECTOR HEEL / ELBOW FLUFFY

## (undated) DEVICE — NDL COUNTER FOAM AND MAGNET 20-40

## (undated) DEVICE — ELCTR BOVIE PENCIL BLADE 10FT

## (undated) DEVICE — CANNULA MICROAIRE FLARED MERCEDES 4MM 30CM

## (undated) DEVICE — DRSG MAMMARY SUPPORT XL SIZE 5

## (undated) DEVICE — SYR LUER LOK 3CC

## (undated) DEVICE — SUT POLYSORB 2-0 30" GS-21 UNDYED

## (undated) DEVICE — PLV/PSP-ESU FORCEFX F8I7418A: Type: DURABLE MEDICAL EQUIPMENT

## (undated) DEVICE — SPECIMEN BOARD FOR BREAST SPECIMEN

## (undated) DEVICE — SUT MONOCRYL 4-0 18" PS-2